# Patient Record
Sex: MALE | Race: WHITE | Employment: FULL TIME | URBAN - METROPOLITAN AREA
[De-identification: names, ages, dates, MRNs, and addresses within clinical notes are randomized per-mention and may not be internally consistent; named-entity substitution may affect disease eponyms.]

---

## 2019-02-01 ENCOUNTER — OFFICE VISIT (OUTPATIENT)
Dept: FAMILY MEDICINE CLINIC | Facility: CLINIC | Age: 49
End: 2019-02-01

## 2019-02-01 VITALS
SYSTOLIC BLOOD PRESSURE: 132 MMHG | DIASTOLIC BLOOD PRESSURE: 76 MMHG | RESPIRATION RATE: 16 BRPM | HEIGHT: 76.5 IN | TEMPERATURE: 98 F | BODY MASS INDEX: 37.96 KG/M2 | OXYGEN SATURATION: 98 % | HEART RATE: 72 BPM | WEIGHT: 315 LBS

## 2019-02-01 DIAGNOSIS — E66.01 CLASS 3 SEVERE OBESITY DUE TO EXCESS CALORIES WITHOUT SERIOUS COMORBIDITY WITH BODY MASS INDEX (BMI) OF 50.0 TO 59.9 IN ADULT (HCC): ICD-10-CM

## 2019-02-01 DIAGNOSIS — M54.9 UPPER BACK PAIN ON LEFT SIDE: Primary | ICD-10-CM

## 2019-02-01 DIAGNOSIS — R20.2 PARESTHESIAS IN LEFT HAND: ICD-10-CM

## 2019-02-01 PROCEDURE — 99203 OFFICE O/P NEW LOW 30 MIN: CPT | Performed by: FAMILY MEDICINE

## 2019-02-01 RX ORDER — CARISOPRODOL 350 MG/1
350 TABLET ORAL 2 TIMES DAILY PRN
Qty: 10 TABLET | Refills: 0 | Status: SHIPPED | OUTPATIENT
Start: 2019-02-01 | End: 2019-02-07 | Stop reason: ALTCHOICE

## 2019-02-01 RX ORDER — PREDNISONE 10 MG/1
10 TABLET ORAL 2 TIMES DAILY
Qty: 10 TABLET | Refills: 0 | Status: SHIPPED | OUTPATIENT
Start: 2019-02-01 | End: 2019-02-07 | Stop reason: ALTCHOICE

## 2019-02-01 RX ORDER — PREDNISONE 20 MG/1
20 TABLET ORAL 2 TIMES DAILY
Qty: 10 TABLET | Refills: 0 | Status: SHIPPED | OUTPATIENT
Start: 2019-02-01 | End: 2019-02-07 | Stop reason: ALTCHOICE

## 2019-02-01 NOTE — PROGRESS NOTES
HPI:   Erasmo Sinha is a 53year old male that presents for Patient presents with:  Back Pain: pinched nerve upper back, pt was getting his teeth cleaned and when they leaned him back in the chair he felt it pinch and since then has gotten worse.  pt has rhythm, no murmurs, rubs or gallops. LUNGS: Clear to auscultation bilterally, no rales/rhonchi/wheezing. ABDOMEN:  Soft, nondistended, nontender, bowel sounds normal in all 4 quadrants, no masses, no hepatosplenomegaly.   EXTREMITIES:  No edema, no cyanos

## 2019-02-02 PROBLEM — E66.813 CLASS 3 SEVERE OBESITY DUE TO EXCESS CALORIES WITHOUT SERIOUS COMORBIDITY WITH BODY MASS INDEX (BMI) OF 50.0 TO 59.9 IN ADULT (HCC): Status: ACTIVE | Noted: 2019-02-02

## 2019-02-02 PROBLEM — M54.9 UPPER BACK PAIN ON LEFT SIDE: Status: ACTIVE | Noted: 2019-02-02

## 2019-02-02 PROBLEM — E66.813 CLASS 3 SEVERE OBESITY DUE TO EXCESS CALORIES WITHOUT SERIOUS COMORBIDITY WITH BODY MASS INDEX (BMI) OF 50.0 TO 59.9 IN ADULT: Status: ACTIVE | Noted: 2019-02-02

## 2019-02-02 PROBLEM — E66.01 CLASS 3 SEVERE OBESITY DUE TO EXCESS CALORIES WITHOUT SERIOUS COMORBIDITY WITH BODY MASS INDEX (BMI) OF 50.0 TO 59.9 IN ADULT (HCC): Status: ACTIVE | Noted: 2019-02-02

## 2019-02-02 PROBLEM — R20.2 PARESTHESIAS IN LEFT HAND: Status: ACTIVE | Noted: 2019-02-02

## 2019-02-07 ENCOUNTER — OFFICE VISIT (OUTPATIENT)
Dept: FAMILY MEDICINE CLINIC | Facility: CLINIC | Age: 49
End: 2019-02-07

## 2019-02-07 VITALS
BODY MASS INDEX: 37.96 KG/M2 | RESPIRATION RATE: 16 BRPM | DIASTOLIC BLOOD PRESSURE: 80 MMHG | TEMPERATURE: 98 F | HEIGHT: 76.5 IN | WEIGHT: 315 LBS | SYSTOLIC BLOOD PRESSURE: 134 MMHG | OXYGEN SATURATION: 99 % | HEART RATE: 74 BPM

## 2019-02-07 DIAGNOSIS — M54.9 UPPER BACK PAIN ON LEFT SIDE: ICD-10-CM

## 2019-02-07 DIAGNOSIS — R20.2 PARESTHESIAS IN LEFT HAND: ICD-10-CM

## 2019-02-07 DIAGNOSIS — Z00.00 ROUTINE ADULT HEALTH MAINTENANCE: Primary | ICD-10-CM

## 2019-02-07 PROCEDURE — 99214 OFFICE O/P EST MOD 30 MIN: CPT | Performed by: FAMILY MEDICINE

## 2019-02-07 RX ORDER — GABAPENTIN 300 MG/1
300 CAPSULE ORAL 3 TIMES DAILY PRN
Qty: 30 CAPSULE | Refills: 0 | Status: SHIPPED | OUTPATIENT
Start: 2019-02-07 | End: 2019-02-18

## 2019-02-07 RX ORDER — CYCLOBENZAPRINE HCL 10 MG
1 TABLET ORAL AS NEEDED
Refills: 0 | COMMUNITY
Start: 2019-01-27 | End: 2019-04-05

## 2019-02-09 NOTE — PROGRESS NOTES
HPI:   Yamilex Vega is a 53year old male that presents for Patient presents with:  Arm Pain: pinched nerve pain , Left forearm - having trouble extending out arm. Patient states that he has some pain in the left shoulder as well off and on.   He den turgor. HEART:  Regular rate and rhythm, no murmurs, rubs or gallops. LUNGS: Clear to auscultation bilterally, no rales/rhonchi/wheezing. ABDOMEN:  Soft, nondistended, nontender, bowel sounds normal in all 4 quadrants, no masses, no hepatosplenomegaly.

## 2019-02-15 ENCOUNTER — OFFICE VISIT (OUTPATIENT)
Dept: FAMILY MEDICINE CLINIC | Facility: CLINIC | Age: 49
End: 2019-02-15

## 2019-02-15 VITALS
DIASTOLIC BLOOD PRESSURE: 76 MMHG | BODY MASS INDEX: 37.96 KG/M2 | HEIGHT: 76.5 IN | WEIGHT: 315 LBS | OXYGEN SATURATION: 99 % | RESPIRATION RATE: 16 BRPM | SYSTOLIC BLOOD PRESSURE: 134 MMHG | TEMPERATURE: 98 F | HEART RATE: 76 BPM

## 2019-02-15 DIAGNOSIS — M25.512 LEFT SHOULDER PAIN, UNSPECIFIED CHRONICITY: Primary | ICD-10-CM

## 2019-02-15 DIAGNOSIS — R20.2 PARESTHESIAS IN LEFT HAND: ICD-10-CM

## 2019-02-15 PROCEDURE — 99214 OFFICE O/P EST MOD 30 MIN: CPT | Performed by: FAMILY MEDICINE

## 2019-02-15 NOTE — PROGRESS NOTES
HPI:   Joann Wolf is a 53year old male that presents for Patient presents with:   Follow - Up: pinched nerve follow up- both medications have helped but fingers are tingling still.  '  Patient is here for follow-up of left scapular pain and paresthesi good dentition. NECK: Supple, no cervical LAD, no thyromegaly. SKIN: No rashes, no skin lesion, no bruising, good turgor. HEART:  Regular rate and rhythm, no murmurs, rubs or gallops. LUNGS: Clear to auscultation bilterally, no rales/rhonchi/wheezing.

## 2019-02-18 ENCOUNTER — TELEPHONE (OUTPATIENT)
Dept: FAMILY MEDICINE CLINIC | Facility: CLINIC | Age: 49
End: 2019-02-18

## 2019-02-18 DIAGNOSIS — Z00.00 ROUTINE ADULT HEALTH MAINTENANCE: ICD-10-CM

## 2019-02-18 RX ORDER — GABAPENTIN 300 MG/1
300 CAPSULE ORAL 3 TIMES DAILY PRN
Qty: 90 CAPSULE | Refills: 0 | Status: SHIPPED | OUTPATIENT
Start: 2019-02-18 | End: 2019-04-05

## 2019-02-18 NOTE — TELEPHONE ENCOUNTER
See 2-15-19 OV notes per Dr. Dominik Matamoros, sent in [employer]. Pt informed. Task completed.

## 2019-02-18 NOTE — TELEPHONE ENCOUNTER
Patient called for a refill for gabapentin 300 MG Oral Cap  He was just here last Friday and forgot to request the refill and is out of the medication. He is going out of town and has to leave today by 10:30 am - he will be out of town for a week.   Please

## 2019-02-25 NOTE — PROGRESS NOTES
SPINE EVALUATION:   Referring Physician: Dr. Creola Cheadle  Diagnosis: Left shoulder pain, unspecified chronicity (M25.512)       Date of Service: 2/25/2019     PATIENT SUMMARY   Chance Cobos is a 53year old y/o male who presents to therapy today with comp returned to walking  Reports fear of lifting, grabbing something due to setting off that higher pain a lot. Pt works in sales, lots of sitting in the day, does have a standing desk now.    Pt describes pain level at worst 3/10, at best 0/10, at worst 1/10 Christine Fletcher would benefit from skilled Physical Therapy to address the above impairments to progress towards PLOF, return to working out, improve tolerance for standing/sitting for work purpose.      Precautions:  None  OBJECTIVE:   Observation/Posture: sit daily), rotation R with towel faciliation x 1 x 2 daily  Charges: PT Eval Low Complexity, TE x 1; TA x 1      Total Timed Treatment: 25 min     Total Treatment Time: 53 min     PLAN OF CARE:    Goals:    (all to be met in 10 visits)  · Pt will demonstrate

## 2019-02-26 ENCOUNTER — OFFICE VISIT (OUTPATIENT)
Dept: PHYSICAL THERAPY | Age: 49
End: 2019-02-26
Attending: FAMILY MEDICINE
Payer: COMMERCIAL

## 2019-02-26 PROCEDURE — 97161 PT EVAL LOW COMPLEX 20 MIN: CPT

## 2019-02-26 PROCEDURE — 97530 THERAPEUTIC ACTIVITIES: CPT

## 2019-02-26 PROCEDURE — 97110 THERAPEUTIC EXERCISES: CPT

## 2019-02-28 ENCOUNTER — OFFICE VISIT (OUTPATIENT)
Dept: PHYSICAL THERAPY | Age: 49
End: 2019-02-28
Attending: FAMILY MEDICINE
Payer: COMMERCIAL

## 2019-02-28 DIAGNOSIS — M25.512 LEFT SHOULDER PAIN, UNSPECIFIED CHRONICITY: ICD-10-CM

## 2019-02-28 PROCEDURE — 97110 THERAPEUTIC EXERCISES: CPT

## 2019-02-28 PROCEDURE — 97112 NEUROMUSCULAR REEDUCATION: CPT

## 2019-02-28 PROCEDURE — 97140 MANUAL THERAPY 1/> REGIONS: CPT

## 2019-02-28 NOTE — PROGRESS NOTES
Dx: Associated DX:  Left shoulder pain, unspecified chronicity (M25.512)           Authorized # of Visits:  8 HMO (10 req POC)         Next MD visit: none scheduled  Fall Risk: standard         Precautions: n/a           Authorizing Provider: Elena Barrera cervical AROM rotation to >75 degrees to improve tolerance for ADL such as turning head to check blind spot while driving   · Postural strength goal to be added once tested  · Pt will be able to lift 25# without reproduction of numbness or pain symptoms.  Kit Ayala

## 2019-03-04 ENCOUNTER — APPOINTMENT (OUTPATIENT)
Dept: PHYSICAL THERAPY | Age: 49
End: 2019-03-04
Attending: FAMILY MEDICINE
Payer: COMMERCIAL

## 2019-03-08 ENCOUNTER — OFFICE VISIT (OUTPATIENT)
Dept: PHYSICAL THERAPY | Age: 49
End: 2019-03-08
Attending: FAMILY MEDICINE
Payer: COMMERCIAL

## 2019-03-08 DIAGNOSIS — M25.512 LEFT SHOULDER PAIN, UNSPECIFIED CHRONICITY: ICD-10-CM

## 2019-03-08 PROCEDURE — 97012 MECHANICAL TRACTION THERAPY: CPT

## 2019-03-08 PROCEDURE — 97140 MANUAL THERAPY 1/> REGIONS: CPT

## 2019-03-08 PROCEDURE — 97110 THERAPEUTIC EXERCISES: CPT

## 2019-03-08 NOTE — PROGRESS NOTES
Dx: Associated DX:  Left shoulder pain, unspecified chronicity (M25.512)           Authorized # of Visits:  8 HMO (10 req POC)         Next MD visit: none scheduled  Fall Risk: standard         Precautions: n/a           Authorizing Provider: Janiya Sierra ADL such as turning head to check blind spot while driving   · Postural strength goal to be added once tested  · Pt will be able to lift 25# without reproduction of numbness or pain symptoms.  MET   · Pt will improve wrist extension strength to 5/5 to impro

## 2019-03-11 ENCOUNTER — OFFICE VISIT (OUTPATIENT)
Dept: PHYSICAL THERAPY | Age: 49
End: 2019-03-11
Attending: FAMILY MEDICINE
Payer: COMMERCIAL

## 2019-03-11 PROCEDURE — 97012 MECHANICAL TRACTION THERAPY: CPT

## 2019-03-11 PROCEDURE — 97140 MANUAL THERAPY 1/> REGIONS: CPT

## 2019-03-11 PROCEDURE — 97110 THERAPEUTIC EXERCISES: CPT

## 2019-03-11 NOTE — PROGRESS NOTES
Dx: Associated DX:  Left shoulder pain, unspecified chronicity (M25.512)           Authorized # of Visits:  8 HMO (10 req POC)         Next MD visit: none scheduled  Fall Risk: standard         Precautions: n/a           Authorizing Provider: Jayant Melgoza into overhead cabinets  · Pt will improve cervical AROM rotation to >75 degrees to improve tolerance for ADL such as turning head to check blind spot while driving   · Postural strength goal to be added once tested  · Pt will be able to lift 25# without re Treatment Time: 45 min

## 2019-03-13 ENCOUNTER — APPOINTMENT (OUTPATIENT)
Dept: PHYSICAL THERAPY | Age: 49
End: 2019-03-13
Attending: FAMILY MEDICINE
Payer: COMMERCIAL

## 2019-03-14 NOTE — PROGRESS NOTES
Dx: Associated DX:  Left shoulder pain, unspecified chronicity (M25.512)           Authorized # of Visits:  8 HMO (10 req POC)         Next MD visit: none scheduled  Fall Risk: standard         Precautions: n/a           Authorizing Provider: Elena Barrera extension AROM and improved range. Farther range with rotation L prior to onset of symptoms. Will continue to progress within tolerance. Repeated cues throughout session for neutral cervical position.      Patient was instructed in and issued a HEP for [facility] DNF supine holds 15s x 5      Manual opening techniques downglides R, L PAIVVM Median nerve glide x 15 x 2 Towel rotation into L rotation x 10 Ed on lifting restrictions and graded progression      Cervical retraction sitting, symptomatic pec stretch 3 x 3

## 2019-03-15 ENCOUNTER — OFFICE VISIT (OUTPATIENT)
Dept: PHYSICAL THERAPY | Age: 49
End: 2019-03-15
Attending: FAMILY MEDICINE
Payer: COMMERCIAL

## 2019-03-15 PROCEDURE — 97112 NEUROMUSCULAR REEDUCATION: CPT

## 2019-03-15 PROCEDURE — 97140 MANUAL THERAPY 1/> REGIONS: CPT

## 2019-03-15 PROCEDURE — 97110 THERAPEUTIC EXERCISES: CPT

## 2019-03-15 NOTE — PROGRESS NOTES
Dx: Associated DX:  Left shoulder pain, unspecified chronicity (M25.512)           Authorized # of Visits:  8 HMO (10 req POC)         Next MD visit: none scheduled  Fall Risk: standard         Precautions: n/a           Authorizing Provider: Mert Johnson for median nerve glide(3 x15), scapular retractions 5s x 10(2x daily), rotation R with towel faciliation x 1 x 2 daily ; cervical retraction supine, suboccipital stretch self, pec stretch    Goals:   (all to be met in 10 visits)  · Pt will demonstrate 45 d Median nerve glide x 15 x 2 Towel rotation into L rotation x 10 Ed on lifting restrictions and graded progression suboccipital release     Cervical retraction sitting, symptomatic pec stretch 3 x 30s Rows 2 x 12 light discussed ex to hold at [facility] Cervical di

## 2019-03-18 ENCOUNTER — OFFICE VISIT (OUTPATIENT)
Dept: PHYSICAL THERAPY | Age: 49
End: 2019-03-18
Attending: FAMILY MEDICINE
Payer: COMMERCIAL

## 2019-03-18 PROCEDURE — 97112 NEUROMUSCULAR REEDUCATION: CPT

## 2019-03-18 PROCEDURE — 97140 MANUAL THERAPY 1/> REGIONS: CPT

## 2019-03-18 PROCEDURE — 97110 THERAPEUTIC EXERCISES: CPT

## 2019-03-20 ENCOUNTER — APPOINTMENT (OUTPATIENT)
Dept: PHYSICAL THERAPY | Age: 49
End: 2019-03-20
Attending: FAMILY MEDICINE
Payer: COMMERCIAL

## 2019-03-22 ENCOUNTER — OFFICE VISIT (OUTPATIENT)
Dept: PHYSICAL THERAPY | Age: 49
End: 2019-03-22
Attending: FAMILY MEDICINE
Payer: COMMERCIAL

## 2019-03-22 PROCEDURE — 97112 NEUROMUSCULAR REEDUCATION: CPT

## 2019-03-22 PROCEDURE — 97140 MANUAL THERAPY 1/> REGIONS: CPT

## 2019-03-22 PROCEDURE — 97530 THERAPEUTIC ACTIVITIES: CPT

## 2019-03-22 PROCEDURE — 97110 THERAPEUTIC EXERCISES: CPT

## 2019-03-22 NOTE — PROGRESS NOTES
Dx: Associated DX:  Left shoulder pain, unspecified chronicity (M25.512)           Authorized # of Visits:  8 HMO (10 req POC)         Next MD visit: none scheduled  Fall Risk: standard         Precautions: n/a           Authorizing Provider: Derrick Sanchez Monday(symptom-dependent), to monitor tolerance of return to working out and discussion safe progression.      Patient was instructed in and issued a HEP for median nerve glide(3 x15), scapular retractions 5s x 10(2x daily), rotation R with towel faciliatio into L rotation Manual opening technique downglides R, L PAIVVM pec stretch 3 x 30s Cervical distraction, suboccipital release downglides L G3 downglides L G3    DNF endurance testing, ed HEP DNF supine holds 15s x 5 Cross body adduction x 15 DNF supine ho

## 2019-03-25 ENCOUNTER — OFFICE VISIT (OUTPATIENT)
Dept: PHYSICAL THERAPY | Age: 49
End: 2019-03-25
Attending: FAMILY MEDICINE
Payer: COMMERCIAL

## 2019-03-25 PROCEDURE — 97110 THERAPEUTIC EXERCISES: CPT

## 2019-03-25 PROCEDURE — 97112 NEUROMUSCULAR REEDUCATION: CPT

## 2019-03-25 PROCEDURE — 97140 MANUAL THERAPY 1/> REGIONS: CPT

## 2019-03-25 NOTE — PROGRESS NOTES
Discharge Summary    Dx: Associated DX:  Left shoulder pain, unspecified chronicity (M25.512)           Authorized # of Visits:  8 HMO (10 req POC)         Next MD visit: none scheduled  Fall Risk: standard         Precautions: n/a           Authorizing P all planes. Gil Lock verbalizes more awareness for postural positioning throughout day and with working out. Gil Lock does note that end range L rotation causes some slight increase in second and third digit finger numbness but significantly improved.  Bala lifting rest/weights    Scapular testing Seated cervical retraction Manual opening technique downglides R, L PAIVVM transverse mobs into L rotation multiple reps up to G4 transverse mobs thoracic spine into L rotation multiple reps up to G4 transverse mobs cervical retraction maintenance. Front raise with 5# B x 10 with ball behind head and mild cervical retraction maintenance.   D2 ext 2 x 12 RTB D2 ext 2 x 12 RTB   B ext 2 x 12 light mechanical traction up to 16-18# 30s on, 20s off, 9 min total   cerv ball

## 2019-03-29 ENCOUNTER — APPOINTMENT (OUTPATIENT)
Dept: PHYSICAL THERAPY | Age: 49
End: 2019-03-29
Attending: FAMILY MEDICINE
Payer: COMMERCIAL

## 2019-04-05 ENCOUNTER — OFFICE VISIT (OUTPATIENT)
Dept: FAMILY MEDICINE CLINIC | Facility: CLINIC | Age: 49
End: 2019-04-05

## 2019-04-05 VITALS
SYSTOLIC BLOOD PRESSURE: 138 MMHG | BODY MASS INDEX: 37.96 KG/M2 | HEIGHT: 76.5 IN | HEART RATE: 67 BPM | TEMPERATURE: 98 F | OXYGEN SATURATION: 100 % | RESPIRATION RATE: 14 BRPM | DIASTOLIC BLOOD PRESSURE: 78 MMHG | WEIGHT: 315 LBS

## 2019-04-05 DIAGNOSIS — M25.512 LEFT SHOULDER PAIN, UNSPECIFIED CHRONICITY: Primary | ICD-10-CM

## 2019-04-05 DIAGNOSIS — M79.674 PAIN OF TOE OF RIGHT FOOT: ICD-10-CM

## 2019-04-05 DIAGNOSIS — E66.01 CLASS 3 SEVERE OBESITY DUE TO EXCESS CALORIES WITHOUT SERIOUS COMORBIDITY WITH BODY MASS INDEX (BMI) OF 50.0 TO 59.9 IN ADULT (HCC): ICD-10-CM

## 2019-04-05 PROCEDURE — 99214 OFFICE O/P EST MOD 30 MIN: CPT | Performed by: FAMILY MEDICINE

## 2019-04-05 NOTE — PROGRESS NOTES
HPI:   Nyasia Wood is a 53year old male that presents for Patient presents with:  Shoulder Pain: follow up on PT    Patient states that his shoulder is doing much better. He has finished physical therapy.   He does have a possible hammertoe on the sec normal in all 4 quadrants, no masses, no hepatosplenomegaly. EXTREMITIES:  No edema, no cyanosis, 2+ radial pulses b/l. NEURO:  Grossly normal     ASSESSMENT AND PLAN:      1. Pain of toe of right foot  - PODIATRY - INTERNAL    2.  Class 3 severe obesity

## 2019-07-18 ENCOUNTER — OFFICE VISIT (OUTPATIENT)
Dept: FAMILY MEDICINE CLINIC | Facility: CLINIC | Age: 49
End: 2019-07-18

## 2019-07-18 VITALS
WEIGHT: 315 LBS | HEART RATE: 65 BPM | RESPIRATION RATE: 16 BRPM | DIASTOLIC BLOOD PRESSURE: 80 MMHG | HEIGHT: 76.5 IN | BODY MASS INDEX: 37.96 KG/M2 | SYSTOLIC BLOOD PRESSURE: 130 MMHG | TEMPERATURE: 97 F | OXYGEN SATURATION: 98 %

## 2019-07-18 DIAGNOSIS — Z01.89 ROUTINE LAB DRAW: ICD-10-CM

## 2019-07-18 DIAGNOSIS — L03.031 PARONYCHIA OF GREAT TOE OF RIGHT FOOT: Primary | ICD-10-CM

## 2019-07-18 PROCEDURE — 10060 I&D ABSCESS SIMPLE/SINGLE: CPT | Performed by: PHYSICIAN ASSISTANT

## 2019-07-18 PROCEDURE — 99213 OFFICE O/P EST LOW 20 MIN: CPT | Performed by: PHYSICIAN ASSISTANT

## 2019-07-18 RX ORDER — CLINDAMYCIN HYDROCHLORIDE 300 MG/1
300 CAPSULE ORAL 2 TIMES DAILY
Qty: 20 CAPSULE | Refills: 0 | Status: SHIPPED | OUTPATIENT
Start: 2019-07-18 | End: 2020-09-18

## 2019-07-18 NOTE — PROGRESS NOTES
[facility] Internal Medicine Progress Note    CC:  Patient presents with: Infection: right great toe. HPI:   HPI  Pt went kayaking over the weekend and on Monday his R big toe was irritated.   He thought it was the new water shoes he was wea benefits and risk of bleeding or worsening infection and the patient gave verbal consent to continue with procedure.   Preparation with alcohol  Anesthesia: none  Incision with an 18 gauge needle  Drainage of 1 ml of pus  Sent to culture: no  Dressing: Baci

## 2020-07-02 ENCOUNTER — TELEPHONE (OUTPATIENT)
Dept: FAMILY MEDICINE CLINIC | Facility: CLINIC | Age: 50
End: 2020-07-02

## 2020-07-02 NOTE — TELEPHONE ENCOUNTER
Patient would like to know where he can have his blood pressure checked. Patient states that he is feeling well. Patient will call office to schedule an annual wellness, but would like to know where to go to check his BP in the meantime.

## 2020-09-18 ENCOUNTER — OFFICE VISIT (OUTPATIENT)
Dept: FAMILY MEDICINE CLINIC | Facility: CLINIC | Age: 50
End: 2020-09-18

## 2020-09-18 VITALS
TEMPERATURE: 97 F | SYSTOLIC BLOOD PRESSURE: 128 MMHG | OXYGEN SATURATION: 99 % | RESPIRATION RATE: 16 BRPM | HEIGHT: 76 IN | BODY MASS INDEX: 38.36 KG/M2 | DIASTOLIC BLOOD PRESSURE: 82 MMHG | HEART RATE: 63 BPM | WEIGHT: 315 LBS

## 2020-09-18 DIAGNOSIS — Z00.00 ROUTINE GENERAL MEDICAL EXAMINATION AT A HEALTH CARE FACILITY: Primary | ICD-10-CM

## 2020-09-18 DIAGNOSIS — E66.01 CLASS 3 SEVERE OBESITY DUE TO EXCESS CALORIES WITHOUT SERIOUS COMORBIDITY WITH BODY MASS INDEX (BMI) OF 50.0 TO 59.9 IN ADULT (HCC): ICD-10-CM

## 2020-09-18 DIAGNOSIS — H93.13 TINNITUS OF BOTH EARS: ICD-10-CM

## 2020-09-18 DIAGNOSIS — F41.9 ANXIETY: ICD-10-CM

## 2020-09-18 PROCEDURE — 99214 OFFICE O/P EST MOD 30 MIN: CPT | Performed by: FAMILY MEDICINE

## 2020-09-18 PROCEDURE — G0438 PPPS, INITIAL VISIT: HCPCS | Performed by: FAMILY MEDICINE

## 2020-09-18 PROCEDURE — 99396 PREV VISIT EST AGE 40-64: CPT | Performed by: FAMILY MEDICINE

## 2020-09-18 PROCEDURE — 3008F BODY MASS INDEX DOCD: CPT | Performed by: FAMILY MEDICINE

## 2020-09-18 PROCEDURE — 3074F SYST BP LT 130 MM HG: CPT | Performed by: FAMILY MEDICINE

## 2020-09-18 PROCEDURE — 3079F DIAST BP 80-89 MM HG: CPT | Performed by: FAMILY MEDICINE

## 2020-09-18 NOTE — PROGRESS NOTES
Saeed Taylor is a 51year old male who presents for a complete physical exam.   HPI:   Pt complains of tinnitus in both ears off and on for the past 1 year. It is mild but persistent. He notices it more at nighttime.   He also states that having anxiet weight gain/weight loss/enlargement of neck glands/polyuria/polydypsia  ALL/ASTHMA: denies allergic rhinitis/asthma    EXAM:   /82 (BP Location: Right arm, Patient Position: Sitting)   Pulse 63   Temp 96.9 °F (36.1 °C) (Temporal)   Resp 16   Ht 76\" health care facility  -     CBC WITH DIFFERENTIAL WITH PLATELET; Future  -     COMP METABOLIC PANEL (14); Future  -     LIPID PANEL;  Future  -     HEMOGLOBIN A1C; Future  -     TSH+FREE T4; Future    Tinnitus of both ears  -     ENT - INTERNAL    Anxiety

## 2020-11-15 ENCOUNTER — OFFICE VISIT (OUTPATIENT)
Dept: FAMILY MEDICINE CLINIC | Facility: CLINIC | Age: 50
End: 2020-11-15

## 2020-11-15 VITALS
HEART RATE: 67 BPM | TEMPERATURE: 99 F | RESPIRATION RATE: 16 BRPM | WEIGHT: 315 LBS | HEIGHT: 76 IN | BODY MASS INDEX: 38.36 KG/M2 | SYSTOLIC BLOOD PRESSURE: 138 MMHG | DIASTOLIC BLOOD PRESSURE: 82 MMHG | OXYGEN SATURATION: 99 %

## 2020-11-15 DIAGNOSIS — Z20.822 EXPOSURE TO COVID-19 VIRUS: Primary | ICD-10-CM

## 2020-11-15 PROCEDURE — 3075F SYST BP GE 130 - 139MM HG: CPT | Performed by: NURSE PRACTITIONER

## 2020-11-15 PROCEDURE — 3079F DIAST BP 80-89 MM HG: CPT | Performed by: NURSE PRACTITIONER

## 2020-11-15 PROCEDURE — 3008F BODY MASS INDEX DOCD: CPT | Performed by: NURSE PRACTITIONER

## 2020-11-15 PROCEDURE — 99212 OFFICE O/P EST SF 10 MIN: CPT | Performed by: NURSE PRACTITIONER

## 2020-11-15 NOTE — PROGRESS NOTES
CHIEF COMPLAINT:   Patient presents with:  Testing: covid exposure, no sx      HPI:   Jah Hou is a 53year old male who presents for potential Covid 19 exposure.  Reports daughters classroom has 9 positive cases, daughter sat next to someone who was after testing negative. Meds & Refills for this Visit:  Requested Prescriptions      No prescriptions requested or ordered in this encounter                       There are no Patient Instructions on file for this visit.         The patient indicates

## 2021-06-21 ENCOUNTER — OFFICE VISIT (OUTPATIENT)
Dept: FAMILY MEDICINE CLINIC | Facility: CLINIC | Age: 51
End: 2021-06-21

## 2021-06-21 VITALS
SYSTOLIC BLOOD PRESSURE: 122 MMHG | RESPIRATION RATE: 18 BRPM | WEIGHT: 315 LBS | TEMPERATURE: 97 F | HEART RATE: 84 BPM | BODY MASS INDEX: 38.36 KG/M2 | HEIGHT: 76 IN | OXYGEN SATURATION: 97 % | DIASTOLIC BLOOD PRESSURE: 80 MMHG

## 2021-06-21 DIAGNOSIS — Z00.00 WELLNESS EXAMINATION: Primary | ICD-10-CM

## 2021-06-21 PROCEDURE — 3008F BODY MASS INDEX DOCD: CPT | Performed by: FAMILY MEDICINE

## 2021-06-21 PROCEDURE — 90471 IMMUNIZATION ADMIN: CPT | Performed by: FAMILY MEDICINE

## 2021-06-21 PROCEDURE — 90715 TDAP VACCINE 7 YRS/> IM: CPT | Performed by: FAMILY MEDICINE

## 2021-06-21 PROCEDURE — 99396 PREV VISIT EST AGE 40-64: CPT | Performed by: FAMILY MEDICINE

## 2021-06-21 PROCEDURE — 3074F SYST BP LT 130 MM HG: CPT | Performed by: FAMILY MEDICINE

## 2021-06-21 PROCEDURE — 3079F DIAST BP 80-89 MM HG: CPT | Performed by: FAMILY MEDICINE

## 2021-06-21 NOTE — PROGRESS NOTES
Wellness Exam    REASON FOR VISIT:    Queta Baker is a 53year old male who presents for an [address]     Current Complaints: Mr. Sudhakar Hernadez is here for his boy  physical  Flu Shot: see immunization record  Health Maintenance Topics wi Recommendation Internal Lab or Procedure External Lab or Procedure   Cholesterol Screening Recommended screening varies with age, risk and gender No results found for: LDL, LDLC    Diabetes Screening  If history of high blood pressure or other  risk factor easily. Psychiatric/Behavioral: Negative for confusion and agitation. The patient is not nervous/anxious.       EXAM:   /80 (BP Location: Right arm, Patient Position: Sitting, Cuff Size: large)   Pulse 84   Temp 97 °F (36.1 °C) (Temporal)   Resp 18 DIPHTHERIA TOXOIDS AND ACELLULAR PERTUSIS VACCINE (TDAP), >7 YEARS, IM USE        This note was prepared using Dragon Medical voice recognition dictation software. As a result errors may occur. When identified these errors have been corrected.  While every

## 2021-06-21 NOTE — PATIENT INSTRUCTIONS
Thank you for choosing Dahiana Beavers MD at [place] [address]  To Do: Damion Mccarty  1. Please see age appropriate health prevention below    [employer] is located in [address]. Monday, Tuesday & Friday – 8 a.m. to 4 p.m.   Wednesday, Thurs the benefits outweigh those potential risks and we strive to make you healthier and to improve your quality of life.     Referrals, and Radiology Information:    If your insurance requires a referral to a specialist, please allow 5 business days to process exams   Blood pressure All men in this age group Yearly checkup if your blood pressure reading is normal  Normal blood pressure is less than 120/80 mm Hg  If your blood pressure is higher than normal, follow the advice of your healthcare provider      Depr dose should be given at least 2 months after the second dose and at least 4 months after the first dose   Haemophilus influenzae Type B (HIB) Men at increased risk for infection – talk with your healthcare provider 1 to 3 doses   Influenza (flu) All men in

## 2021-11-18 ENCOUNTER — TELEPHONE (OUTPATIENT)
Dept: FAMILY MEDICINE CLINIC | Facility: CLINIC | Age: 51
End: 2021-11-18

## 2021-11-18 DIAGNOSIS — Z00.00 LABORATORY EXAMINATION ORDERED AS PART OF A ROUTINE GENERAL MEDICAL EXAMINATION: Primary | ICD-10-CM

## 2021-11-18 NOTE — TELEPHONE ENCOUNTER
Future Appointments   Date Time Provider Carol Winn   11/19/2021 10:40 AM Anup Padilla MD [facility] [address] [facility] [address]     Patient would like lab orders to go early am, please advise.

## 2021-11-19 ENCOUNTER — PATIENT MESSAGE (OUTPATIENT)
Dept: FAMILY MEDICINE CLINIC | Facility: CLINIC | Age: 51
End: 2021-11-19

## 2021-11-19 ENCOUNTER — LAB ENCOUNTER (OUTPATIENT)
Dept: LAB | Age: 51
End: 2021-11-19
Attending: FAMILY MEDICINE
Payer: COMMERCIAL

## 2021-11-19 ENCOUNTER — TELEPHONE (OUTPATIENT)
Dept: FAMILY MEDICINE CLINIC | Facility: CLINIC | Age: 51
End: 2021-11-19

## 2021-11-19 ENCOUNTER — OFFICE VISIT (OUTPATIENT)
Dept: FAMILY MEDICINE CLINIC | Facility: CLINIC | Age: 51
End: 2021-11-19

## 2021-11-19 VITALS
RESPIRATION RATE: 16 BRPM | OXYGEN SATURATION: 100 % | WEIGHT: 315 LBS | HEART RATE: 54 BPM | BODY MASS INDEX: 38.36 KG/M2 | SYSTOLIC BLOOD PRESSURE: 118 MMHG | HEIGHT: 76 IN | TEMPERATURE: 97 F | DIASTOLIC BLOOD PRESSURE: 70 MMHG

## 2021-11-19 DIAGNOSIS — Z23 NEED FOR SHINGLES VACCINE: Primary | ICD-10-CM

## 2021-11-19 DIAGNOSIS — E66.01 CLASS 3 SEVERE OBESITY DUE TO EXCESS CALORIES WITHOUT SERIOUS COMORBIDITY WITH BODY MASS INDEX (BMI) OF 50.0 TO 59.9 IN ADULT (HCC): Primary | ICD-10-CM

## 2021-11-19 DIAGNOSIS — Z00.00 ROUTINE ADULT HEALTH MAINTENANCE: ICD-10-CM

## 2021-11-19 DIAGNOSIS — Z00.00 LABORATORY EXAMINATION ORDERED AS PART OF A ROUTINE GENERAL MEDICAL EXAMINATION: ICD-10-CM

## 2021-11-19 DIAGNOSIS — Z12.11 SCREENING FOR COLON CANCER: ICD-10-CM

## 2021-11-19 DIAGNOSIS — Z23 NEED FOR VACCINATION: ICD-10-CM

## 2021-11-19 DIAGNOSIS — Z00.00 ROUTINE ADULT HEALTH MAINTENANCE: Primary | ICD-10-CM

## 2021-11-19 PROCEDURE — 99214 OFFICE O/P EST MOD 30 MIN: CPT | Performed by: FAMILY MEDICINE

## 2021-11-19 PROCEDURE — 80061 LIPID PANEL: CPT

## 2021-11-19 PROCEDURE — 80053 COMPREHEN METABOLIC PANEL: CPT

## 2021-11-19 PROCEDURE — 84443 ASSAY THYROID STIM HORMONE: CPT

## 2021-11-19 PROCEDURE — 90750 HZV VACC RECOMBINANT IM: CPT | Performed by: FAMILY MEDICINE

## 2021-11-19 PROCEDURE — 3008F BODY MASS INDEX DOCD: CPT | Performed by: FAMILY MEDICINE

## 2021-11-19 PROCEDURE — 90686 IIV4 VACC NO PRSV 0.5 ML IM: CPT | Performed by: FAMILY MEDICINE

## 2021-11-19 PROCEDURE — 84439 ASSAY OF FREE THYROXINE: CPT

## 2021-11-19 PROCEDURE — 90471 IMMUNIZATION ADMIN: CPT | Performed by: FAMILY MEDICINE

## 2021-11-19 PROCEDURE — 90472 IMMUNIZATION ADMIN EACH ADD: CPT | Performed by: FAMILY MEDICINE

## 2021-11-19 PROCEDURE — 36415 COLL VENOUS BLD VENIPUNCTURE: CPT

## 2021-11-19 PROCEDURE — 3078F DIAST BP <80 MM HG: CPT | Performed by: FAMILY MEDICINE

## 2021-11-19 PROCEDURE — 85025 COMPLETE CBC W/AUTO DIFF WBC: CPT

## 2021-11-19 PROCEDURE — 3074F SYST BP LT 130 MM HG: CPT | Performed by: FAMILY MEDICINE

## 2021-11-19 NOTE — TELEPHONE ENCOUNTER
Pt scheduled Shingrix #2    Shingrix #1: 11/19/21    Order pended    Future Appointments   Date Time Provider Carol Winn   1/21/2022  1:00 PM [facility] 20 NURSE [facility] [address] [facility] [address]

## 2021-11-19 NOTE — TELEPHONE ENCOUNTER
Dr. Zack Nunez- Pt scheduled 2nd shingles vaccine.     Future Appointments   Date Time Provider Carol Winn   1/21/2022  1:00 PM [facility] 20 NURSE [facility] [address] [facility] [address]

## 2021-11-22 NOTE — PROGRESS NOTES
HPI:   Flavio Sterling is a 49year old male that presents for Patient presents with:  Weight Check  Immunization/Injection: flu vaccine today - due for [facility] - had his booster COVID vaccine on 10/28/21  Other: due for colonoscopy - referral pended    P lymphadenopathy, no thyromegaly. SKIN: no rashes, no suspicious skin lesions, no bruising, good skin turgor. HEART: S1 and S2, regular rate and rhythm, no murmurs, gallops, or rubs.   LUNGS: clear to auscultation bilterally, no rales, rhonchi, or wheezing

## 2021-11-22 NOTE — TELEPHONE ENCOUNTER
From: Roseann Sandoval  To: Marisue Felty, MD  Sent: 11/19/2021 6:04 PM CST  Subject: Question regarding CBC W/ DIFFERENTIAL    A couple of the results/values were on the lower end of the “green” range. Is there anything I need to be concerned about.

## 2022-01-21 ENCOUNTER — NURSE ONLY (OUTPATIENT)
Dept: FAMILY MEDICINE CLINIC | Facility: CLINIC | Age: 52
End: 2022-01-21
Payer: COMMERCIAL

## 2022-01-21 DIAGNOSIS — Z23 NEED FOR SHINGLES VACCINE: ICD-10-CM

## 2022-01-21 PROCEDURE — 90471 IMMUNIZATION ADMIN: CPT | Performed by: FAMILY MEDICINE

## 2022-01-21 PROCEDURE — 90750 HZV VACC RECOMBINANT IM: CPT | Performed by: FAMILY MEDICINE

## 2022-02-08 ENCOUNTER — HOSPITAL ENCOUNTER (OUTPATIENT)
Dept: BONE DENSITY | Age: 52
Discharge: HOME | End: 2022-02-08
Attending: FAMILY MEDICINE

## 2022-02-08 DIAGNOSIS — Z13.9 ENCOUNTER FOR SCREENING: ICD-10-CM

## 2022-02-09 ENCOUNTER — TELEPHONE (OUTPATIENT)
Dept: INTEGRATIVE MEDICINE | Facility: CLINIC | Age: 52
End: 2022-02-09

## 2022-02-09 ENCOUNTER — OFFICE VISIT (OUTPATIENT)
Dept: FAMILY MEDICINE CLINIC | Facility: CLINIC | Age: 52
End: 2022-02-09
Payer: COMMERCIAL

## 2022-02-09 VITALS
HEIGHT: 76 IN | BODY MASS INDEX: 38.36 KG/M2 | HEART RATE: 55 BPM | TEMPERATURE: 97 F | OXYGEN SATURATION: 100 % | SYSTOLIC BLOOD PRESSURE: 110 MMHG | DIASTOLIC BLOOD PRESSURE: 72 MMHG | RESPIRATION RATE: 18 BRPM | WEIGHT: 315 LBS

## 2022-02-09 DIAGNOSIS — E66.01 CLASS 3 SEVERE OBESITY DUE TO EXCESS CALORIES WITHOUT SERIOUS COMORBIDITY WITH BODY MASS INDEX (BMI) OF 45.0 TO 49.9 IN ADULT (HCC): Primary | ICD-10-CM

## 2022-02-09 PROCEDURE — 3008F BODY MASS INDEX DOCD: CPT | Performed by: FAMILY MEDICINE

## 2022-02-09 PROCEDURE — 3074F SYST BP LT 130 MM HG: CPT | Performed by: FAMILY MEDICINE

## 2022-02-09 PROCEDURE — 3078F DIAST BP <80 MM HG: CPT | Performed by: FAMILY MEDICINE

## 2022-02-09 PROCEDURE — 99214 OFFICE O/P EST MOD 30 MIN: CPT | Performed by: FAMILY MEDICINE

## 2022-02-09 NOTE — TELEPHONE ENCOUNTER
Spoke to patient regarding DEXA body composition test results he had completed. Test ordered by MA in PCP's office under Dr. Collin House. Results came to Dr. Army Jia ash. Will discuss with provider the next steps and inform the patient.

## 2022-02-10 PROBLEM — E66.813 CLASS 3 SEVERE OBESITY DUE TO EXCESS CALORIES WITHOUT SERIOUS COMORBIDITY WITH BODY MASS INDEX (BMI) OF 45.0 TO 49.9 IN ADULT: Status: ACTIVE | Noted: 2019-02-02

## 2022-02-10 PROBLEM — E66.813 CLASS 3 SEVERE OBESITY DUE TO EXCESS CALORIES WITHOUT SERIOUS COMORBIDITY WITH BODY MASS INDEX (BMI) OF 50.0 TO 59.9 IN ADULT (HCC): Status: RESOLVED | Noted: 2019-02-02 | Resolved: 2022-02-10

## 2022-02-10 PROBLEM — E66.01 CLASS 3 SEVERE OBESITY DUE TO EXCESS CALORIES WITHOUT SERIOUS COMORBIDITY WITH BODY MASS INDEX (BMI) OF 50.0 TO 59.9 IN ADULT (HCC): Status: RESOLVED | Noted: 2019-02-02 | Resolved: 2022-02-10

## 2022-02-10 PROBLEM — E66.813 CLASS 3 SEVERE OBESITY DUE TO EXCESS CALORIES WITHOUT SERIOUS COMORBIDITY WITH BODY MASS INDEX (BMI) OF 50.0 TO 59.9 IN ADULT: Status: RESOLVED | Noted: 2019-02-02 | Resolved: 2022-02-10

## 2022-02-10 PROBLEM — E66.01 CLASS 3 SEVERE OBESITY DUE TO EXCESS CALORIES WITHOUT SERIOUS COMORBIDITY WITH BODY MASS INDEX (BMI) OF 45.0 TO 49.9 IN ADULT (HCC): Status: ACTIVE | Noted: 2019-02-02

## 2022-02-10 PROBLEM — E66.813 CLASS 3 SEVERE OBESITY DUE TO EXCESS CALORIES WITHOUT SERIOUS COMORBIDITY WITH BODY MASS INDEX (BMI) OF 45.0 TO 49.9 IN ADULT (HCC): Status: ACTIVE | Noted: 2019-02-02

## 2022-02-15 NOTE — TELEPHONE ENCOUNTER
Spoke to patient, he states he called earlier to state he received a MyChart message from Dr. Andrea Ortiz stating he can interpret the test. Will confirm with Dr. Vinita Rueda and then inform the patient. Patient states he is willing to wait until Dr. Tawanna Dooley returns, will schedule with her.

## 2022-02-28 ENCOUNTER — TELEMEDICINE (OUTPATIENT)
Dept: INTEGRATIVE MEDICINE | Facility: CLINIC | Age: 52
End: 2022-02-28

## 2022-02-28 DIAGNOSIS — E66.01 CLASS 3 SEVERE OBESITY DUE TO EXCESS CALORIES WITHOUT SERIOUS COMORBIDITY WITH BODY MASS INDEX (BMI) OF 45.0 TO 49.9 IN ADULT (HCC): Primary | ICD-10-CM

## 2022-02-28 DIAGNOSIS — R53.82 CHRONIC FATIGUE: ICD-10-CM

## 2022-02-28 PROCEDURE — 99214 OFFICE O/P EST MOD 30 MIN: CPT | Performed by: FAMILY MEDICINE

## 2022-04-06 ENCOUNTER — HOSPITAL ENCOUNTER (OUTPATIENT)
Facility: HOSPITAL | Age: 52
Setting detail: HOSPITAL OUTPATIENT SURGERY
Discharge: HOME | End: 2022-04-06
Attending: INTERNAL MEDICINE | Admitting: INTERNAL MEDICINE
Payer: COMMERCIAL

## 2022-04-06 ENCOUNTER — ANESTHESIA (OUTPATIENT)
Dept: ENDOSCOPY | Facility: HOSPITAL | Age: 52
End: 2022-04-06
Payer: COMMERCIAL

## 2022-04-06 ENCOUNTER — ANESTHESIA EVENT (OUTPATIENT)
Dept: ENDOSCOPY | Facility: HOSPITAL | Age: 52
End: 2022-04-06
Payer: COMMERCIAL

## 2022-04-06 VITALS
RESPIRATION RATE: 16 BRPM | BODY MASS INDEX: 38.36 KG/M2 | DIASTOLIC BLOOD PRESSURE: 55 MMHG | HEART RATE: 57 BPM | HEIGHT: 76 IN | WEIGHT: 315 LBS | TEMPERATURE: 98 F | OXYGEN SATURATION: 97 % | SYSTOLIC BLOOD PRESSURE: 98 MMHG

## 2022-04-06 DIAGNOSIS — Z12.11 SCREEN FOR COLON CANCER: ICD-10-CM

## 2022-04-06 PROCEDURE — 0DJD8ZZ INSPECTION OF LOWER INTESTINAL TRACT, VIA NATURAL OR ARTIFICIAL OPENING ENDOSCOPIC: ICD-10-PCS | Performed by: INTERNAL MEDICINE

## 2022-04-06 RX ORDER — SODIUM CHLORIDE, SODIUM LACTATE, POTASSIUM CHLORIDE, CALCIUM CHLORIDE 600; 310; 30; 20 MG/100ML; MG/100ML; MG/100ML; MG/100ML
INJECTION, SOLUTION INTRAVENOUS CONTINUOUS
Status: DISCONTINUED | OUTPATIENT
Start: 2022-04-06 | End: 2022-04-06

## 2022-04-06 RX ORDER — ONDANSETRON 2 MG/ML
4 INJECTION INTRAMUSCULAR; INTRAVENOUS AS NEEDED
Status: DISCONTINUED | OUTPATIENT
Start: 2022-04-06 | End: 2022-04-06

## 2022-04-06 RX ORDER — NALOXONE HYDROCHLORIDE 0.4 MG/ML
80 INJECTION, SOLUTION INTRAMUSCULAR; INTRAVENOUS; SUBCUTANEOUS AS NEEDED
Status: DISCONTINUED | OUTPATIENT
Start: 2022-04-06 | End: 2022-04-06

## 2022-04-06 RX ORDER — LIDOCAINE HYDROCHLORIDE 10 MG/ML
INJECTION, SOLUTION EPIDURAL; INFILTRATION; INTRACAUDAL; PERINEURAL AS NEEDED
Status: DISCONTINUED | OUTPATIENT
Start: 2022-04-06 | End: 2022-04-06 | Stop reason: SURG

## 2022-04-06 RX ADMIN — LIDOCAINE HYDROCHLORIDE 10 MG: 10 INJECTION, SOLUTION EPIDURAL; INFILTRATION; INTRACAUDAL; PERINEURAL at 07:55:00

## 2022-04-06 NOTE — ANESTHESIA POSTPROCEDURE EVALUATION
[facility] Tyrus Sprawls Patient Status:  Hospital Outpatient Surgery   Age/Gender 49year old male MRN [id number]   Location [address] [facility] Attending Osvaldo Phan, [facility] Day # 0 PCP Veronica Lugo MD       Anesthesia Post-op Note    COLONOSCOPY    Procedure Summary     Date: 04/06/22 Room / Location: [facility] ENDOSCOPY 02 / [facility] ENDOSCOPY    Anesthesia Start: 0252 Anesthesia Stop:     Procedure: COLONOSCOPY (N/A ) Diagnosis:       Screen for colon cancer      (internal hemorrhoids)    Surgeons: Osvaldo Phan DO Anesthesiologist: Ambika Andrew MD    Anesthesia Type: MAC ASA Status: 2          Anesthesia Type: MAC    Vitals Value Taken Time   /49 04/06/22 0821   Temp 98 04/06/22 0823   Pulse 67 04/06/22 0821   Resp 12 04/06/22 0823   SpO2 97 % 04/06/22 0821   Vitals shown include unvalidated device data. Patient Location: Endoscopy    Anesthesia Type: MAC    Airway Patency: patent    Postop Pain Control: adequate    Mental Status: mildly sedated but able to meaningfully participate in the post-anesthesia evaluation    Nausea/Vomiting: none    Cardiopulmonary/Hydration status: stable euvolemic    Complications: no apparent anesthesia related complications    Postop vital signs: stable    Dental Exam: Unchanged from Preop    Patient to be discharged from PACU when criteria met.

## 2022-10-20 ENCOUNTER — IMMUNIZATION (OUTPATIENT)
Dept: FAMILY MEDICINE CLINIC | Facility: CLINIC | Age: 52
End: 2022-10-20
Payer: COMMERCIAL

## 2022-10-20 DIAGNOSIS — Z23 NEED FOR VACCINATION: Primary | ICD-10-CM

## 2022-10-20 PROCEDURE — 90686 IIV4 VACC NO PRSV 0.5 ML IM: CPT | Performed by: FAMILY MEDICINE

## 2022-10-20 PROCEDURE — 90471 IMMUNIZATION ADMIN: CPT | Performed by: FAMILY MEDICINE

## 2023-04-17 ENCOUNTER — OFFICE VISIT (OUTPATIENT)
Dept: FAMILY MEDICINE CLINIC | Facility: CLINIC | Age: 53
End: 2023-04-17
Payer: COMMERCIAL

## 2023-04-17 VITALS
DIASTOLIC BLOOD PRESSURE: 62 MMHG | HEIGHT: 76 IN | RESPIRATION RATE: 16 BRPM | OXYGEN SATURATION: 98 % | BODY MASS INDEX: 37.8 KG/M2 | HEART RATE: 58 BPM | SYSTOLIC BLOOD PRESSURE: 110 MMHG | WEIGHT: 310.38 LBS | TEMPERATURE: 97 F

## 2023-04-17 DIAGNOSIS — Z00.00 ROUTINE ADULT HEALTH MAINTENANCE: Primary | ICD-10-CM

## 2023-04-17 DIAGNOSIS — Z12.5 SCREENING PSA (PROSTATE SPECIFIC ANTIGEN): ICD-10-CM

## 2023-04-17 DIAGNOSIS — E66.09 CLASS 2 OBESITY DUE TO EXCESS CALORIES WITHOUT SERIOUS COMORBIDITY WITH BODY MASS INDEX (BMI) OF 37.0 TO 37.9 IN ADULT: ICD-10-CM

## 2023-04-17 PROBLEM — R20.2 PARESTHESIAS IN LEFT HAND: Status: RESOLVED | Noted: 2019-02-02 | Resolved: 2023-04-17

## 2023-04-17 PROBLEM — M54.9 UPPER BACK PAIN ON LEFT SIDE: Status: RESOLVED | Noted: 2019-02-02 | Resolved: 2023-04-17

## 2023-04-17 PROBLEM — E66.01 CLASS 3 SEVERE OBESITY DUE TO EXCESS CALORIES WITHOUT SERIOUS COMORBIDITY WITH BODY MASS INDEX (BMI) OF 45.0 TO 49.9 IN ADULT (HCC): Status: RESOLVED | Noted: 2019-02-02 | Resolved: 2023-04-17

## 2023-04-17 PROBLEM — M25.512 LEFT SHOULDER PAIN: Status: RESOLVED | Noted: 2019-02-15 | Resolved: 2023-04-17

## 2023-04-17 PROBLEM — E66.812 CLASS 2 OBESITY DUE TO EXCESS CALORIES WITHOUT SERIOUS COMORBIDITY WITH BODY MASS INDEX (BMI) OF 37.0 TO 37.9 IN ADULT: Status: ACTIVE | Noted: 2019-02-02

## 2023-04-17 PROBLEM — E66.813 CLASS 3 SEVERE OBESITY DUE TO EXCESS CALORIES WITHOUT SERIOUS COMORBIDITY WITH BODY MASS INDEX (BMI) OF 45.0 TO 49.9 IN ADULT (HCC): Status: RESOLVED | Noted: 2019-02-02 | Resolved: 2023-04-17

## 2023-04-17 PROBLEM — E66.813 CLASS 3 SEVERE OBESITY DUE TO EXCESS CALORIES WITHOUT SERIOUS COMORBIDITY WITH BODY MASS INDEX (BMI) OF 45.0 TO 49.9 IN ADULT: Status: RESOLVED | Noted: 2019-02-02 | Resolved: 2023-04-17

## 2023-05-24 ENCOUNTER — OFFICE VISIT (OUTPATIENT)
Dept: FAMILY MEDICINE CLINIC | Facility: CLINIC | Age: 53
End: 2023-05-24
Payer: COMMERCIAL

## 2023-05-24 VITALS
OXYGEN SATURATION: 98 % | HEART RATE: 59 BPM | SYSTOLIC BLOOD PRESSURE: 122 MMHG | DIASTOLIC BLOOD PRESSURE: 82 MMHG | HEIGHT: 78 IN | WEIGHT: 290 LBS | BODY MASS INDEX: 33.55 KG/M2 | RESPIRATION RATE: 14 BRPM

## 2023-05-24 DIAGNOSIS — E66.09 CLASS 2 OBESITY DUE TO EXCESS CALORIES WITHOUT SERIOUS COMORBIDITY WITH BODY MASS INDEX (BMI) OF 37.0 TO 37.9 IN ADULT: Primary | ICD-10-CM

## 2023-05-24 PROCEDURE — 99214 OFFICE O/P EST MOD 30 MIN: CPT | Performed by: FAMILY MEDICINE

## 2023-05-24 PROCEDURE — 3074F SYST BP LT 130 MM HG: CPT | Performed by: FAMILY MEDICINE

## 2023-05-24 PROCEDURE — 3079F DIAST BP 80-89 MM HG: CPT | Performed by: FAMILY MEDICINE

## 2023-05-24 PROCEDURE — 3008F BODY MASS INDEX DOCD: CPT | Performed by: FAMILY MEDICINE

## 2023-11-08 ENCOUNTER — IMMUNIZATION (OUTPATIENT)
Dept: FAMILY MEDICINE CLINIC | Facility: CLINIC | Age: 53
End: 2023-11-08
Payer: COMMERCIAL

## 2023-11-08 DIAGNOSIS — Z23 NEED FOR VACCINATION: Primary | ICD-10-CM

## 2023-11-08 PROCEDURE — 90686 IIV4 VACC NO PRSV 0.5 ML IM: CPT | Performed by: FAMILY MEDICINE

## 2023-11-08 PROCEDURE — 90471 IMMUNIZATION ADMIN: CPT | Performed by: FAMILY MEDICINE

## 2024-03-14 NOTE — TELEPHONE ENCOUNTER
Pt noted to be hypotensive and minimally responsive to pain. Provider made aware.    Spoke to pt, states he is not having issues with his BP he is just curious. Advised he could go to any pharmacy to have it checked but not sure if machines are available due to Covid-19.  Pt states he will just call back to schedule appt and have BP checked

## 2024-05-03 ENCOUNTER — HOSPITAL ENCOUNTER (OUTPATIENT)
Dept: GENERAL RADIOLOGY | Age: 54
Discharge: HOME | End: 2024-05-03
Attending: FAMILY MEDICINE
Payer: COMMERCIAL

## 2024-05-03 ENCOUNTER — OFFICE VISIT (OUTPATIENT)
Dept: FAMILY MEDICINE CLINIC | Facility: CLINIC | Age: 54
End: 2024-05-03
Payer: COMMERCIAL

## 2024-05-03 VITALS
OXYGEN SATURATION: 99 % | DIASTOLIC BLOOD PRESSURE: 60 MMHG | BODY MASS INDEX: 37.96 KG/M2 | SYSTOLIC BLOOD PRESSURE: 98 MMHG | HEIGHT: 76.5 IN | TEMPERATURE: 98 F | RESPIRATION RATE: 18 BRPM | WEIGHT: 315 LBS | HEART RATE: 71 BPM

## 2024-05-03 DIAGNOSIS — M79.605 LEG PAIN, ANTERIOR, LEFT: ICD-10-CM

## 2024-05-03 DIAGNOSIS — M79.605 LEG PAIN, ANTERIOR, LEFT: Primary | ICD-10-CM

## 2024-05-03 PROCEDURE — 3078F DIAST BP <80 MM HG: CPT | Performed by: FAMILY MEDICINE

## 2024-05-03 PROCEDURE — 3008F BODY MASS INDEX DOCD: CPT | Performed by: FAMILY MEDICINE

## 2024-05-03 PROCEDURE — 73610 X-RAY EXAM OF ANKLE: CPT | Performed by: FAMILY MEDICINE

## 2024-05-03 PROCEDURE — 73590 X-RAY EXAM OF LOWER LEG: CPT | Performed by: FAMILY MEDICINE

## 2024-05-03 PROCEDURE — 3074F SYST BP LT 130 MM HG: CPT | Performed by: FAMILY MEDICINE

## 2024-05-03 PROCEDURE — 99214 OFFICE O/P EST MOD 30 MIN: CPT | Performed by: FAMILY MEDICINE

## 2024-05-06 ENCOUNTER — TELEPHONE (OUTPATIENT)
Dept: FAMILY MEDICINE CLINIC | Facility: CLINIC | Age: 54
End: 2024-05-06

## 2024-05-06 DIAGNOSIS — M79.605 LEG PAIN, ANTERIOR, LEFT: Primary | ICD-10-CM

## 2024-05-06 RX ORDER — MELOXICAM 15 MG/1
15 TABLET ORAL DAILY
Qty: 30 TABLET | Refills: 0 | Status: SHIPPED | OUTPATIENT
Start: 2024-05-06 | End: 2024-06-05

## 2024-05-06 RX ORDER — METHYLPREDNISOLONE 4 MG/1
TABLET ORAL
Qty: 21 EACH | Refills: 0 | Status: SHIPPED | OUTPATIENT
Start: 2024-05-06

## 2024-05-06 NOTE — TELEPHONE ENCOUNTER
Patient was seen on Friday by Kirby Had an xray as well. Patient would like to know what the plan is-further testing, medication, physical therapy? Patient has gone from being able to walk 12 miles to none at all. Please advise.

## 2024-05-06 NOTE — TELEPHONE ENCOUNTER
Kirby Faye, APRN  You2 minutes ago (1:11 PM)       I ll place order for PT and to see the ortho Doc that is also a podiatrist have him follow up with her. No more testing at this time.    Kirby

## 2024-05-06 NOTE — PROGRESS NOTES
CHIEF COMPLAINT:     Chief Complaint   Patient presents with    Leg Pain     Patient states he has been having left lower leg pain, possibly shin splint per patient. Patient wants to make sure there isn't a stress fracture.       HPI:   Sherman Foote is a 53 year old male. Patient's presenting with an injury to: LLE    Cause - Increase in activity, walking and exercising  Onset - about 2 weeks  Location of pain - lower left leg  Pain described as aching pain, worse with activity  Pain scale - 5/10  Radiation - up to knee and down to ankle  Pain is aggravated by movement, use and palpation  Pain is alleviated by massage compression rest, NSAIDS, immobilization and ice  Associated symptoms:  n/a  Care prior to arrival consisted of massage, compression, rest, NSAID, elevation and ice, with minimal relief.    No current outpatient medications on file.      Past Medical History:    Visual impairment    contacts/glasses      Social History:  Social History     Socioeconomic History    Marital status:    Tobacco Use    Smoking status: Never    Smokeless tobacco: Never   Vaping Use    Vaping status: Never Used   Substance and Sexual Activity    Alcohol use: Yes     Comment: occasionally    Drug use: No    Sexual activity: Yes        REVIEW OF SYSTEMS:   GENERAL HEALTH: feels well otherwise  SKIN: denies any unusual skin lesions or rashes, did have immediate swelling to area.   RESPIRATORY: denies shortness of breath with exertion  CARDIOVASCULAR: denies chest pain on exertion  GI: denies abdominal pain and denies heartburn  NEURO: denies numbness and tingling sensation.     EXAM:   BP 98/60 (BP Location: Left arm, Patient Position: Sitting, Cuff Size: adult)   Pulse 71   Temp 97.6 °F (36.4 °C) (Temporal)   Resp 18   Ht 6' 4.5\" (1.943 m)   Wt (!) 318 lb 6.4 oz (144.4 kg)   SpO2 99%   BMI 38.25 kg/m²   GENERAL: well developed, well nourished,in no apparent distress  SKIN: no rashes,no suspicious lesions,  skin's intact.   HEENT: atraumatic, normocephalic,ears and throat are clear  NECK: supple,no adenopathy,no bruits  LUNGS: clear to auscultation  CARDIO: RRR without murmur  GI: good BS's,no masses, HSM or tenderness  EXTREMITIES: no cyanosis, clubbing or edema  NEURO: normal sensation distally and normal cap refill distally.  Exam of LLE   - swelling: minor  - tenderness: moderate  - deformity/defect:  none obvious  - crepitus: none  - ecchymosis/bruising: none  - length/size (in cm): size of golf ball  - tendon / deep structures intact: yes  - Range of motion: normal   - pedal pulse: 2+  - sensation: intact  - Motor exam/strength/flex and ext: normal 5/5      ASSESSMENT AND PLAN:   Assessment:   Encounter Diagnosis   Name Primary?    Leg pain, anterior, left Yes         Plan: Compression, Brace, Massage  Rest, Ice, Elevation, NSAID. Reduce activity until injury slows or show improvement. Begin previous exercise schedule slowly, sigficantly decrease speed and distance for a few weeks when restarting.   Will xray to assess for stress fracture.  Likely tendonitis, shinsplints, or less likely stress fracture.     Will follow up with PT and or ortho if not improving.   IF XRAY show fracture, discussed need for immobilizing air cast and no weight bearing on that leg until see by ortho.      Follow-up with PCP for worsening symptoms or no improvement  Medication use, dose, and possible side effects discussed.    Meds & Refills for this Visit:  Requested Prescriptions      No prescriptions requested or ordered in this encounter           There are no Patient Instructions on file for this visit.        The patient and parents indicate understanding of these issues and agrees to the plan.  The patient indicates understanding of these issues and agrees to the plan.

## 2024-05-08 ENCOUNTER — OFFICE VISIT (OUTPATIENT)
Dept: ORTHOPEDICS CLINIC | Facility: CLINIC | Age: 54
End: 2024-05-08
Payer: COMMERCIAL

## 2024-05-08 VITALS — BODY MASS INDEX: 37.6 KG/M2 | HEIGHT: 76.5 IN | WEIGHT: 312 LBS

## 2024-05-08 DIAGNOSIS — S86.892A LEFT MEDIAL TIBIAL STRESS SYNDROME, INITIAL ENCOUNTER: Primary | ICD-10-CM

## 2024-05-08 DIAGNOSIS — M76.812 TIBIALIS ANTICUS TENDINITIS, LEFT: ICD-10-CM

## 2024-05-10 NOTE — H&P
Sports Medicine Clinic Note    Subjective:    Chief Complaint   Patient presents with    Leg or Foot Injury     Left shin pain; referral from   Onset - 2 wks ago, walking 12 miles a day and I felt pain  Pain score -1-2, peak - 9     Chief Complaint: Left shin pain    History of Present Illness: This is a very pleasant 53 year old year old male who presents with left shin pain, predominantly located along the medial aspect of the tibia as well as over anterior compartment musculature in the same leg. They report a gradual onset of pain over several weeks, exacerbated by long-distance walking. He has accomplished significant amounts of intentional weight loss attributed to exercise and dieting and long distance walking has been an integral part of this. Walks over 10 miles a day as part of this regimen but hasn't been able to due to this pain. The patient notes that the discomfort eases with rest but reoccurs upon resuming activity. They deny any recent trauma or falls.     Objective:    Left Lower Leg Examination:    Inspection: No significant swelling or deformity observed along the medial tibia, without noticeable bruising or deformity.    Palpation: Tenderness along the medial border of the tibia and the muscle belly of the tibialis anticus. No palpable warmth or crepitus.    Range of Motion: Full active and passive range of motion in the ankle and knee with pain reproduced on active ankle dorsiflexion in the anterior lower leg compartment.    Neurovascular: Distal pulses intact in both extremities. Sensory examination unremarkable.    Special Tests: Mild discomfort elicited during the shin splint test.    Imaging:    Radiographs of the left tibia/fibula and ankle were personally reviewed in the office, showing no fractures, dislocations, or periosteal reactions. Bone mineral density appears normal. Evidence of Achilles enthesopathy and calcaneal plantar spur which are not clinically relevant to his shin  pain.    Assessment:    1. Left medial tibial stress syndrome, initial encounter  2. Tibialis anticus tendinitis, left    Plan:    Agree that these are likely overuse injuries from his heavy loading through his exercise regimen. This may be compounded by his weight loss which is likely putting him in catabolysis and in fact patient has confirmed he is ketogenic with his diet efforts. We had a long discussion about ways to combat this. The patient is advised to temporarily modify impact activities such as running and heavy walking/hiking and replace them with low-impact exercises like swimming or cycling. Anti-inflammatory medication, like ibuprofen, is recommended for pain management. A referral to physical therapy is deferred for strengthening and stretching exercises focused on the lower limbs but can be considered in the future. Compression sleeves for the shins may provide symptomatic relief during activities. Consideration for an MRI of the tibias if symptoms persist despite conservative management but would not change initial management as above.    Tentative follow-up in 4-6 weeks or sooner if symptoms worsen.    Ermias Elliott DO, CAM   Primary Care Sports Medicine    Department of Orthopaedic Surgery  [address] [address]   [address] [place] [id number]    t: [phone]  f: [phone]      [facility]

## 2024-05-21 ENCOUNTER — TELEPHONE (OUTPATIENT)
Dept: PHYSICAL THERAPY | Facility: HOSPITAL | Age: 54
End: 2024-05-21

## 2024-05-23 ENCOUNTER — OFFICE VISIT (OUTPATIENT)
Dept: PHYSICAL THERAPY | Age: 54
End: 2024-05-23
Attending: FAMILY MEDICINE
Payer: COMMERCIAL

## 2024-05-23 DIAGNOSIS — M79.605 LEG PAIN, ANTERIOR, LEFT: Primary | ICD-10-CM

## 2024-05-23 PROCEDURE — 97161 PT EVAL LOW COMPLEX 20 MIN: CPT

## 2024-05-23 PROCEDURE — 97110 THERAPEUTIC EXERCISES: CPT

## 2024-05-23 NOTE — PROGRESS NOTES
LOWER EXTREMITY EVALUATION:     Diagnosis:   Leg pain, anterior, left (M79.605)       Referring Provider: Kirby Faye  Date of Evaluation:    5/23/2024    Precautions:  None Next MD visit:   none scheduled  Date of Surgery: n/a     PATIENT SUMMARY   Sherman Foote is a 53 year old male who presents to therapy today with complaints of left lower leg pain. Patient states over the last few years he has been exercises more often and trying to loose weight. Slowed down in the winter. Started back up again on 4/24/24 and he started to have sharp left shin pain. Took a few days off and the pain came back. Patient went and saw the doctor because he was worried about a stress fracture. Fracture was ruled out and was told he has shin splints. Patient states he stopped walking for a few weeks and now he is doing much better after the rest and was able to walk 5 miles yesterday without pain. No back pain, no shooting pain in leg, no pain/shooting/numbness in pain. Recently lost a lot of weight. Did take a steroid for the pain but stopped a while ago. Is also taking taking Meloxican.   Pt describes pain level current 0/10, at worst 8/10.   Pain location: L anterior shin; Pain Description sharp  Status (Improving/ unchanging/ worsening AND constant or intermittent): improving  Aggravating factors: walking  Alleviating factors: rest  Night pain: none  Numbness and Tingling: none  Occupation: Sales (mostly sitting and occasionally lifting)  Current functional limitations include prolonged walking   Sherman describes prior level of function Walks 9-10 miles a day. Pt goals include return to prior level of function without pain.  Past medical history was reviewed with Sherman. Significant findings include  has a past medical history of Visual impairment.    ASSESSMENT  Sherman presents to physical therapy evaluation with primary c/o left lower leg pain. The results of the objective tests and measures show decreased hip range  of motion,decreased lower extremity strength, tenderness to palpation and posture contributing to symptoms.  Functional deficits include but are not limited to prolonged walking. Pt and PT discussed evaluation findings, pathology, POC and HEP.  Pt voiced understanding and performs HEP correctly without reported pain. Skilled Physical Therapy is medically necessary to address the above impairments and reach functional goals.     OBJECTIVE:   Observation: slouched sitting posture, slight lower extremity edema  Palpation: TTP anterior tibialis muscle on left; no calf tenderness or pain  AROM: (* denotes performed with pain)  Hip Knee Foot/Ankle   ER R 41 deg; L 38 deg  Flexion: R 125 deg; L 0 deg  Extension: R 125 deg ; L 0 deg    DF: R WNL; L WNL ( no pain)  PF: R WNL; L WNL (No pain)  INV: R WNL; L WNL  EV: R WNL; L WNL     Flexibility:  Hip Flexor: R WNL, L WNL  Hamstrings: R WNL; L WNL  Piriformis: R decreased; L decreased  Quads: R WNL; L WNL  Gastroc-soleus: R WNL; L WNL    Strength/MMT: (* denotes performed with pain)  Hip Knee Foot/Ankle   Flexion: R 5/5; L 5/5  Extension: R 4-/5; L 4-/5  Abduction: R 4/5; L 4/5   Flexion: R 5/5; L 5/5  Extension: R 5/5; L 5/5    DF: R 5/5; L 5/5  PF: R 5/5; L 5/5  INV: R 5/5; L 5/5  EV: R 5/5; L 5/5       Special tests:   Repeated motion testing and Response (Lumbar):  Baseline: no pain  Repeated flexion in standing x 10 no pain  Repeated extension in standing x 10 no pain  Gait: pt ambulates on level ground with normal mechanics    Today’s Treatment and Response:   Pt education was provided on exam findings, treatment diagnosis, treatment plan, expectations, and prognosis. Pt was also provided recommendations for activity modifications, pain science education , detrimental fear avoidance behaviors, and importance of remaining active.  Patient was instructed in and issued a HEP for: tibilais anterior stretching, calf stretching, hip ER, sidelying hip abduction    Charges: PT  Eval Low Complexity, 1TE      Total Timed Treatment: 10 min     Total Treatment Time: 45 min     Based on clinical rationale and outcome measures, this evaluation involved Low Complexity decision making due to 1-2 personal factors/comorbidities, 3 body structures involved/activity limitations, and evolving symptoms including changing pain levels.  PLAN OF CARE:    Goals: (to be met in 6 visits)  Pt will be able to return to walking as previous level of function.   Pt will demonstrate increased hip ER/ABD strength to 5/5 to perform stepping and squatting activities without excessive femoral IR/ADD   Pt will be independent and compliant with comprehensive HEP to maintain progress achieved in PT     Frequency / Duration: Patient will be seen for 2 x/week or a total of 12 visits over a 90 day period. Treatment will include: Manual Therapy, Neuromuscular Re-education, Self-Care Home Management, Therapeutic Activities, Therapeutic Exercise, and Home Exercise Program instruction    Education or treatment limitation: None  Rehab Potential:excellent    LEFS Score  LEFS Score: 72.5 % (5/21/2024  7:56 PM)      Patient/Family/Caregiver was advised of these findings, precautions, and treatment options and has agreed to actively participate in planning and for this course of care.    Thank you for your referral. Please co-sign or sign and return this letter via fax as soon as possible to [phone]. If you have any questions, please contact me at Dept: [phone]    Sincerely,  Electronically signed by therapist: Monica Richards, PT, DPT, Cert.MDT  Physician's certification required: Yes  I certify the need for these services furnished under this plan of treatment and while under my care.    X___________________________________________________ Date____________________    Certification From: 5/23/2024  To:8/21/2024

## 2024-05-30 ENCOUNTER — APPOINTMENT (OUTPATIENT)
Dept: PHYSICAL THERAPY | Age: 54
End: 2024-05-30
Attending: FAMILY MEDICINE
Payer: COMMERCIAL

## 2024-06-07 ENCOUNTER — APPOINTMENT (OUTPATIENT)
Dept: PHYSICAL THERAPY | Age: 54
End: 2024-06-07
Attending: FAMILY MEDICINE
Payer: COMMERCIAL

## 2024-06-14 ENCOUNTER — APPOINTMENT (OUTPATIENT)
Dept: PHYSICAL THERAPY | Age: 54
End: 2024-06-14
Attending: FAMILY MEDICINE
Payer: COMMERCIAL

## 2024-06-19 ENCOUNTER — OFFICE VISIT (OUTPATIENT)
Dept: PHYSICAL THERAPY | Age: 54
End: 2024-06-19
Attending: FAMILY MEDICINE
Payer: COMMERCIAL

## 2024-06-19 PROCEDURE — 97110 THERAPEUTIC EXERCISES: CPT

## 2024-06-19 NOTE — PROGRESS NOTES
Diagnosis:   Leg pain , anterior , left       Referring Provider: Kirby Faye  Date of Evaluation:    5/23/2024    Precautions:  None Next MD visit:   none scheduled  Date of Surgery: n/a   Insurance Primary/Secondary: [employer] [place] / N/A     # Auth Visits: 6             Subjective: \" My left leg feels 100 % better since the star of PT and I am able to ambulate daily 5 mil daily for fitness without any pain . I would  like to hold PT for a month . I will continue with my current HEP at home . I will re-assess a need to continue my PT if I need to to advance my HEP at the time \".    Pain: 0/10      Objective:       Assessment:  reviewed current HEP with patient to ensure correct form of ex's performance . Patient was able to perform all give ex's with correct mm recruitment and without report of left lower leg pain .      Goals: ( to be met in 6 visits )  1.Pt will be able to return to walking as previous level of function.  2.Pt will demonstrate increased hip ER/ABD strength to 5/5 to perform stepping and squatting activities without excessive femoral IR/ADD .  3.Pt will be independent and compliant with comprehensive HEP to maintain progress achieved in PT .    Plan: as per patient request will hold PT sessions at this time .  Date: 6/19/2024  TX#: 2/6 Date:                 TX#: 3/ Date:                 TX#: 4/ Date:                 TX#: 5/ Date:   Tx#: 6/   HEP performance :       Standing :  B gastroc stretch on slant board x 5 with 20 sec hold   B toes raises x 20  B heel raises x 20   B heel raises with toes out 2 x 10   B heel raises with toes in 2 x 10  Alt hip flexion , abd , ext with RTB 2 x 10 each   Sidestepping with RTB 30 steps each dir   Monster walk with RTB 30 steps each dir with RTB                      HEP:     Charges: THERE EX X 3        Total Timed Treatment: 40 min  Total Treatment Time: 40 min

## 2025-04-17 ENCOUNTER — HOSPITAL ENCOUNTER (OUTPATIENT)
Dept: GENERAL RADIOLOGY | Age: 55
Discharge: HOME | End: 2025-04-17
Attending: FAMILY MEDICINE
Payer: COMMERCIAL

## 2025-04-17 ENCOUNTER — TELEPHONE (OUTPATIENT)
Dept: ORTHOPEDICS CLINIC | Facility: CLINIC | Age: 55
End: 2025-04-17

## 2025-04-17 ENCOUNTER — OFFICE VISIT (OUTPATIENT)
Dept: FAMILY MEDICINE CLINIC | Facility: CLINIC | Age: 55
End: 2025-04-17
Payer: COMMERCIAL

## 2025-04-17 VITALS
OXYGEN SATURATION: 98 % | RESPIRATION RATE: 16 BRPM | HEIGHT: 76.5 IN | HEART RATE: 63 BPM | DIASTOLIC BLOOD PRESSURE: 70 MMHG | TEMPERATURE: 98 F | WEIGHT: 315 LBS | SYSTOLIC BLOOD PRESSURE: 102 MMHG | BODY MASS INDEX: 37.96 KG/M2

## 2025-04-17 DIAGNOSIS — Z00.00 WELLNESS EXAMINATION: Primary | ICD-10-CM

## 2025-04-17 DIAGNOSIS — G89.29 CHRONIC PAIN OF BOTH KNEES: ICD-10-CM

## 2025-04-17 DIAGNOSIS — M25.561 CHRONIC PAIN OF BOTH KNEES: ICD-10-CM

## 2025-04-17 DIAGNOSIS — M25.551 PAIN OF RIGHT HIP: ICD-10-CM

## 2025-04-17 DIAGNOSIS — M25.562 CHRONIC PAIN OF BOTH KNEES: ICD-10-CM

## 2025-04-17 DIAGNOSIS — Z12.5 SCREENING FOR MALIGNANT NEOPLASM OF PROSTATE: ICD-10-CM

## 2025-04-17 PROCEDURE — 73560 X-RAY EXAM OF KNEE 1 OR 2: CPT | Performed by: FAMILY MEDICINE

## 2025-04-17 PROCEDURE — 73502 X-RAY EXAM HIP UNI 2-3 VIEWS: CPT | Performed by: FAMILY MEDICINE

## 2025-04-17 NOTE — TELEPHONE ENCOUNTER
Future Appointments   Date Time Provider Department Center   4/17/2025  5:45 PM PF XR RM1 [facility] [place]   4/17/2025  7:00 PM PF XR RM1 [facility] [place]   4/17/2025  7:15 PM PF XR RM1 [facility] [place]   4/21/2025  9:00 AM Luis Montero MD [facility]   5/5/2025  8:40 AM [facility] Calles [id number]     Please advise if patient needs right hip xrays.

## 2025-04-17 NOTE — PROGRESS NOTES
Wellness Exam    REASON FOR VISIT:    Sherman Foote is a 54 year old male who presents for an Annual Health Assessment.    Current Complaints: Mr. Foote is here for his wellness exam  Flu Shot: see immunization record  Health Maintenance Topics with due status: Overdue       Topic Date Due    Pneumococcal Vaccine: 50+ Years Never done    PSA 11/19/2023    Annual Physical 04/17/2024    COVID-19 Vaccine 09/01/2024    Annual Depression Screening 01/01/2025     Reported Health:   He is a pleasant 54-year-old gentleman here for his wellness exam.  He is generally healthy.  However he has been experiencing bilateral knee pain since the start of this year.  Most of the pain on the knees is located anterior aspect by his kneecap.  The left knee will occasionally give out.  No recent injury or trauma.  He also has been experiencing right hip pain which he describes as throbbing.  Again with no recent injury or trauma.  However since he has been laid off because of ongoing pain he had gained some weight.  He would easily lose weight if he exercises.  He was an offense of  in college.  He sustained ankle injury and had surgery for it while playing.  Otherwise he has not had any injuries thereafter.    I had reviewed past medical and family histories together with allergy and medication lists documented.    Details about the complaints:  N/A    General Health     How would you describe your current health state?: Good    Type of Diet: Balanced    How do you maintain positive mental well-being?: Social Interaction    How would you describe your daily physical activity?: Moderate    If you are a male age 45-79 or a female age 55-79, do you take aspirin?: No    Have you had any immunizations at another office such as Influenza, Hepatitis B, Tetanus, or Pneumococcal?: No    At any time do you feel concerned for the safety/well-being of yourself and/or your children, in your home or elsewhere?: No     CAGE:     Cut:  Have you ever felt you should Cut down on your drinking?: No    Annoyed: Have people Annoyed you by criticizing your drinking?: No    Guilty: Have you ever felt bad or Guilty about your drinking?: No    Eye Opener: Have you ever had a drink first thing in the morning to steady your nerves or to get rid of a hangover (Eye opener)?: No    Scoring  Total Score: 0     PHQ-4: Over the LAST 2 WEEKS       Depression Screening (PHQ-2/PHQ-9): Over the LAST 2 WEEKS   Little interest or pleasure in doing things (over the last two weeks)?: Not at all    Feeling down, depressed, or hopeless (over the last two weeks)?: Not at all    PHQ-2 SCORE: 0              PREVENTATIVE SERVICES  INDICATIONS AND SCHEDULE Recommendation Internal Lab or Procedure External Lab or Procedure   Colonoscopy Screen Every 10 years Health Maintenance   Topic Date Due    Colorectal Cancer Screening  04/06/2032       Flex Sigmoidoscopy Screen  Every 5 years No results found for this or any previous visit.    Fecal Occult Blood  Annually No results found for: \"FOB\", \"OCCULTSTOOL\"    Obesity Screening Screen all adults annually Body mass index is 42.53 kg/m².      Preventive Services for Which Recommendations Vary with Risk Recommendation Internal Lab or Procedure External Lab or Procedure   Cholesterol Screening Recommended screening varies with age, risk and gender LDL Cholesterol (mg/dL)   Date Value   11/19/2021 92       Diabetes Screening  If history of high blood pressure or other  risk factors No results found for: \"A1C\"  Glucose (mg/dL)   Date Value   11/19/2021 87         Gonorrhea Screening If high risk No results found for: \"GONOCOCCUS\"    HIV Screening For all adults age 18-65, older adults at increased risk No results found for: \"HIV\"    Syphilis Screening Screen if pregnant or high risk No results found for: \"RPR\"    Hepatitis C Screening Screen those at high risk plus screen one time for adults born 1944-1 9[birth year] No results found for: \"HCVAB\"     Tuberculosis Screen If high risk No components found for: \"PPDINDURAT\"      ALLERGIES:   Allergies[1]    CURRENT MEDICATIONS:   Current Medications[2]   MEDICAL INFORMATION:   Past Medical History[3]   Past Surgical History[4]   Family History[5]   SOCIAL HISTORY:   Short Social Hx on File[6]       REVIEW OF SYSTEMS:   Constitutional: Negative for fever, chills and fatigue.   HENT: Negative for hearing loss, congestion, sore throat and neck pain.    Eyes: Negative for pain and visual disturbance.   Respiratory: Negative for cough and shortness of breath.    Cardiovascular: Negative for chest pain and palpitations.   Gastrointestinal: Negative for nausea, vomiting, abdominal pain and diarrhea.   Genitourinary: Negative for urgency, frequency and difficulty urinating.   Musculoskeletal: Negative for arthralgias and no gait problem.   Skin: Negative for color change and rash.   Neurological: Negative for tremors, weakness and numbness.   Hematological: Negative for adenopathy. Does not bruise/bleed easily.   Psychiatric/Behavioral: Negative for confusion and agitation. The patient is not nervous/anxious.      EXAM:   /70   Pulse 63   Temp 97.9 °F (36.6 °C) (Temporal)   Resp 16   Ht 6' 4.5\" (1.943 m)   Wt (!) 354 lb (160.6 kg)   SpO2 98%   BMI 42.53 kg/m²   Constitutional: He appears well-developed, nourished, and his stated age. Vital signs reviewed  Pleasant man   Head: Normocephalic and atraumatic.   Ear: TMs visible and normal bilaterally  Nose: Nose normal.   Eyes: EOM are normal. Pupils are equal, round, and reactive to light. No scleral icterus.   Neck: Normal range of motion. No thyromegaly present.   Cardiovascular: Normal rate, regular rhythm and normal heart sounds.  Exam reveals no friction rub.    No murmur heard.  Pulmonary/Chest: Effort normal and breath sounds normal. He has no wheezes. He has no rales.   Abdominal: Soft. Bowel sounds are normal. There is no tenderness.   Musculoskeletal:  Normal range of motion. He exhibits no edema.   Examination of the left knee reveals mild swelling.  There was mild tenderness on the anterior aspect with crepitus.  There was good range of motion.  No erythema no warmth no swelling.  Negative anterior and posterior drawer  Examination of the right knee reveals no erythema no warmth.  There is mild swelling.  No tenderness noted.  No crepitus.  There was good range of motion.  Negative anterior posterior drawer  Examination of the right hip reveals no erythema no warmth no swelling.  There was good range of motion.  No tenderness.  Lymphadenopathy:     He has no cervical adenopathy.   Neurological: He is alert and oriented to person, place, and time. He has normal reflexes.   Skin: Skin is warm. No rash noted. No erythema. with normal hair  Psychiatric: He has a normal mood and affect. His behavior is normal.     ASSESSMENT AND OTHER RELEVANT CHRONIC CONDITIONS:   Sherman Foote is a 54 year old male who presents for an Annual Health Assessment.   1. Wellness examination    2. Screening for malignant neoplasm of prostate    3. Pain of right hip    4. Chronic pain of both knees        PLAN SUMMARY:   Sherman Foote is a 54 year old male  Age appropriate cancer screening, labs, safety, immunizations were discussed with the patient and ordered as follows:  Diagnoses and all orders for this visit:    Wellness examination  -     CBC With Differential With Platelet; Future  -     Comp Metabolic Panel (14); Future  -     Lipid Panel; Future  -     TSH and Free T4; Future    Screening for malignant neoplasm of prostate  -     PSA Total, Screen; Future    Pain of right hip  -     XR HIP W OR WO PELVIS 2 OR 3 VIEWS, RIGHT (CPT=73502); Future  -     Ortho Referral - In Network    Chronic pain of both knees  -     XR KNEE (1 OR 2 VIEWS), RIGHT (CPT=73560); Future  -     XR KNEE (1 OR 2 VIEWS), LEFT (CPT=73560); Future  -     Ortho Referral - In Network    Will order  corresponding x-rays and will await results.  We will refer to orthopedist for further evaluation and will await recommendations  Differentials include arthritis, inflammatory, musculoskeletal  Will possibly need physical therapy  Will check routine labs and will notify him once we get test results.  Follow-up in 1 year as needed    This note was prepared using Dragon Medical voice recognition dictation software. As a result errors may occur. When identified these errors have been corrected. While every attempt is made to correct errors during dictation discrepancies may still exist            Orders Placed This Encounter   Procedures    CBC With Differential With Platelet    Comp Metabolic Panel (14)    Lipid Panel    TSH and Free T4    PSA Total, Screen       Imaging & Consults:  ORTHOPEDIC - INTERNAL  XR HIP W OR WO PELVIS 2 OR 3 VIEWS, RIGHT (CPT=73502)  XR KNEE (1 OR 2 VIEWS), RIGHT (CPT=73560)  XR KNEE (1 OR 2 VIEWS), LEFT (CPT=73560)    His 5 year prevention plan includes: annual visits for fasting labs  Health Maintenance   Topic Date Due    Pneumococcal Vaccine: 50+ Years (1 of 1 - PCV) Never done    PSA  11/19/2023    Annual Physical  04/17/2024    COVID-19 Vaccine (5 - 2024-25 season) 09/01/2024    Annual Depression Screening  01/01/2025    Influenza Vaccine (Season Ended) 10/01/2025    DTaP,Tdap,and Td Vaccines (2 - Td or Tdap) 06/21/2031    Colorectal Cancer Screening  04/06/2032    Zoster Vaccines  Completed    Meningococcal B Vaccine  Aged Out     Patient/Caregiver Education:  Patient/Caregiver Education: There are no barriers to learning. Medical education done.  Outcome: Patient verbalizes understanding.    Educated by: MD   The patient indicates understanding of these issues and agrees to the plan.    SUGGESTED VACCINATIONS - Influenza, Pneumococcal, Zoster, Tetanus     Immunization History   Administered Date(s) Administered    Covid-19 Vaccine Moderna 100 mcg/0.5 ml 01/31/2021, 02/28/2021,  10/28/2021    Covid-19 Vaccine Moderna Bivalent 50mcg/0.5mL 12+ years 10/19/2022    FLULAVAL 6 months & older 0.5 ml Prefilled syringe (64801) 11/19/2021, 10/20/2022    FLUZONE 6 months and older PFS 0.5 ml (01434) 11/08/2023    Influenza 03/04/2020    TDAP 06/21/2021    Zoster Vaccine Recombinant Adjuvanted (Shingrix) 11/19/2021, 01/21/2022       Influenza Annually   Pneumococcal if high risk   Td/Tdap once then every 10 years   HPV Males 11-21   Zoster (Shingles) 60 and older: one dose   Varicella 2 doses if not immune   MMR 1-2 doses if born after 1955 and not immune     Problem List[7]  PREVENTIVE VISIT,EST,40-64         [1] No Known Allergies  [2]   No current outpatient medications on file.   [3]   Past Medical History:   Visual impairment    contacts/glasses   [4]   Past Surgical History:  Procedure Laterality Date    Ankle fracture surgery      Colonoscopy N/A 4/6/2022    Procedure: COLONOSCOPY;  Surgeon: Noe Joya DO;  Location:  ENDOSCOPY   [5] No family history on file.  [6]   Social History  Socioeconomic History    Marital status:    Tobacco Use    Smoking status: Never    Smokeless tobacco: Never   Vaping Use    Vaping status: Never Used   Substance and Sexual Activity    Alcohol use: Yes     Comment: occasionally    Drug use: No    Sexual activity: Yes   [7]   Patient Active Problem List  Diagnosis    Class 2 obesity due to excess calories without serious comorbidity with body mass index (BMI) of 37.0 to 37.9 in adult

## 2025-04-17 NOTE — TELEPHONE ENCOUNTER
MARIANNE knee xrays with two views have been completed today. We can use this for his upcoming appointment with Dr Wolf, no additional imaging needed as of this date.

## 2025-04-17 NOTE — PATIENT INSTRUCTIONS
Thank you for choosing Josue Chester MD at [facility]  To Do: Sherman Foote  1. Please see age appropriate health prevention below     Call [phone] to schedule the appointment.   Please signup for [employer], which is electronic access to your record if you have not done so.  All your results will post on there.  [email or web address]   You can NOW use [employer] to book your appointments with us, or consider using open access scheduling which is through the [place] website [email or web address] and type in Josue Chester MD and follow the links for \"Schedule Online Now\"    To schedule Imaging or tests at [facility] call Central Scheduling [phone], Go to [facility] ER Building (For example: CT scans, X rays, Ultrasound, MRI)  Cardiac Testing in ER building Building A second floor Cardiac Testing [phone] (For example: Holter Monitor, Cardiac Stress tests,Event Monitor, or 2D Echocardiograms)  [facility] call [phone] usually in [facility]  Walk in Clinic in [place] at [address] Mon-Fri at 8am-7:30 p.m., and Sat/Sun 9:00a.m.-4:30 p.m.  Also at [address] [place] [address]  Call [phone] for info     Please call our office about any questions regarding your treatment/medicines/tests as a result of today's visit.  For your safety, read the entire package insert of all medicines prescribed to you and be aware of all of the risks of treatment even beyond those discussed today.  All therapies have potential risk of harm or side effects or medication interactions.  It is your duty and for your safety to discuss with the pharmacist and our office with questions, and to notify us and stop treatment if problems arise, but know that our intention is that the benefits outweigh those potential risks and we strive to make you healthier and to improve your quality of life.    Referrals, and Radiology Information:    If your insurance requires a referral to a specialist,  please allow 5 business days to process your referral request.    If Josue Chester MD orders a CT or MRI, it may take up to 10 business days to receive approval from your insurance company. Once our office has called informing you that the insurance company approved your testing, please call Central Scheduling at [phone]  Please allow our office 5 business days to contact you regarding any testing results.    Refill policies:   Allow 3 business days for refills; controlled substances may take longer and must be picked up from the office in person.  Narcotic medications can only be filled in 30 day increments and must be refilled at an office visit only.  If your prescription is due for a refill, you may be due for a follow-up appointment.  We cannot refill your maintenance medications at a preventative wellness visit.  To best provide you care, patients receiving maintenance medications need to be seen at least twice a year.

## 2025-04-17 NOTE — TELEPHONE ENCOUNTER
Future Appointments   Date Time Provider Department Center   4/17/2025  5:45 PM PF XR RM1 PF XRAY [place]   4/17/2025  7:00 PM PF XR RM1 PF XRAY [place]   4/17/2025  7:15 PM PF XR RM1 PF XRAY [place]   4/21/2025  9:00 AM Luis Montero MD [facility]   5/5/2025  8:40 AM [id number]     Please advise if patient needs alonso knee xrays.

## 2025-04-18 NOTE — TELEPHONE ENCOUNTER
XR Right Hip 4/17/25  CONCLUSION:     Negative for fracture or malalignment of the pelvis or hips.  Minimal osteoarthritis of the left hip.  Right hip joint space is preserved.  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  Obturator rings are intact.  Normal sacroiliac joints and pubic symphysis.

## 2025-04-21 ENCOUNTER — OFFICE VISIT (OUTPATIENT)
Dept: ORTHOPEDICS CLINIC | Facility: CLINIC | Age: 55
End: 2025-04-21
Payer: COMMERCIAL

## 2025-04-21 VITALS — BODY MASS INDEX: 37.96 KG/M2 | WEIGHT: 315 LBS | HEIGHT: 76.5 IN

## 2025-04-21 DIAGNOSIS — M25.562 CHRONIC PAIN OF LEFT KNEE: ICD-10-CM

## 2025-04-21 DIAGNOSIS — M25.551 RIGHT HIP PAIN: Primary | ICD-10-CM

## 2025-04-21 DIAGNOSIS — G89.29 CHRONIC PAIN OF LEFT KNEE: ICD-10-CM

## 2025-04-21 NOTE — PROGRESS NOTES
Orthopaedic Surgery  [address] [address] [address]   [phone]      Chief Complaint:  Right Hip Pain  Left Knee Pain    History of Present Illness  Sherman Foote is a 54-year-old male who presents with chronic right hip and left knee pain.    He has been experiencing persistent hip pain, described as a throbbing sensation located in the hip area, particularly at night. The pain worsens with prolonged standing, such as during a recent volleyball tournament, and he sometimes needs to adjust his sleeping position to alleviate discomfort. Occasionally, he experiences tingling in the leg, but it does not extend past the knee. No pain in the groin area or radiation down the leg otherwise is noted.    Chronic knee pain is also a significant concern, described as sore all the time, particularly underneath the kneecap. The pain worsens with activity, and he has not been able to go on his regular walks for two and a half months, which has been challenging. He describes episodes where the knee 'gives out' and 'locks up,' causing significant pain and instability. Despite having a high pain tolerance, the pain can be severe enough to make him jump.    He has a history of significant weight loss over the past three years, dropping from nearly 500 pounds to 295 pounds. He maintains a weight between 305 and 318 pounds. Due to the pain, he has not been able to engage in his usual physical activities, such as walking nine miles a day, and expresses concern about the impact on his ability to remain active and participate in activities he enjoys, such as hiking and kayaking.    He has a history of shin splints diagnosed approximately a year ago, for which he received physical therapy and was advised to rest. Initially, he feared a stress fracture due to the severity of the pain. He also has a history of playing football and has experienced various injuries over the years, including having screws in his  tibia. He recalls a specific incident where he slipped on ice, which may have contributed to his knee issues.      PMH/PSH/Family History/Social History/Meds/Allergies:   Past Medical History[1]     Past Surgical History[2]     Family History[3]     Social History     Socioeconomic History    Marital status:      Spouse name: Not on file    Number of children: Not on file    Years of education: Not on file    Highest education level: Not on file   Occupational History    Not on file   Tobacco Use    Smoking status: Never    Smokeless tobacco: Never   Vaping Use    Vaping status: Never Used   Substance and Sexual Activity    Alcohol use: Yes     Comment: occasionally    Drug use: No    Sexual activity: Yes   Other Topics Concern    Not on file   Social History Narrative    Not on file     Social Drivers of Health     Food Insecurity: Not on file   Transportation Needs: Not on file   Stress: Not on file   Housing Stability: Not on file        No current outpatient medications     Allergies[4]       Physical Exam:   Vitals:    04/21/25 0913   Weight: (!) 355 lb (161 kg)   Height: 6' 4.5\" (1.943 m)     Estimated body mass index is 42.65 kg/m² as calculated from the following:    Height as of this encounter: 6' 4.5\" (1.943 m).    Weight as of this encounter: 355 lb (161 kg).    Constitutional: No acute distress, well nourished.  Eyes: Anicteric sclera, pink conjunctiva  Ears, Nose, Mouth and Throat: Normocephalic, atraumatic, moist mucous membranes  Cardiovascular: No pitting edema or varicosities in the lower extremities  Respiratory: No respiratory distress, normal respiratory rhythm and effort   Neurological:  Oriented to person, place, and time.  Psychological:  Appropriate mood and affect.    Right Hip Exam:  No TTP at the greater trochanter, abductors, or IT band  Tolerates full range of motion of the hip without pain  Flexion 110 degrees  External rotation 30 degrees  Internal rotation 10 degrees  Negative  Stinchfield test    Left Knee Exam:      Inspection: No erythema, ecchymoses, or wounds. No rash. No previous incisions noted. No effusion. No quad atrophy  Alignment: neutral  ROM: 0 - 120 degrees, flexion contracture: 0 degrees, quad lag: no  Stability: A/P stress: stable, firm endpoint, M/L stress: stable, firm endpoint  Pain or crepitus with ROM?: Yes. No pain with patellar grind  Non-tender at: medial joint line, lateral joint line, patella, patellar tendon, quadriceps tendon  Strength: Intact 5/5 strength SLR and TA/GS/FHL/EHL  Sensation: Grossly intact to light touch over SPN/DPN/Saph/Sural/Tibial nerve distributions  Vasc: Warm perfused extremity        Imaging:   XRs of the bilateral knees from 4/17/25 were reviewed with AP and lateral views    They show mild degenerative changes of the knee involving the medial compartment(s) with joint space narrowing and subchondral sclerosis. No fracture or dislocation seen    XRs AP Pelvis and 2 views AP and Lat of the right Hip were personally reviewed and interpreted    There are mild degenerative changes of the hip with loss of joint space on the LEFT. Right hip with well maintained joint space  No fracture or dislocation noted    I personally reviewed and interpreted the radiographs.      Assessment:     ICD-10-CM    1. Right hip pain  M25.551 OP REFERRAL TO [facility]      2. Chronic pain of left knee  M25.562 MRI KNEE, LEFT ([id number])    G89.29        Likely lumbar radiculopathy    Plan:   Patient with right hip pain located in the posterior hip  Likely lumbar radiculopathy  He has no anterior groin pain and full range of motion of the hip on exam without pain  He also has excellent well-maintained joint space on x-ray of the right symptomatic hip with mild hip arthritis on the left  I recommend starting with a course of physical therapy and a referral was provided for him today    For the left knee he does complain of mechanical symptoms of  locking with a remote history of injury  I would like to obtain an MRI of the knee to rule out a meniscus tear  He also may have a component of patellar tendinitis and tendinitis involving the biceps femoris tendon based on the location of his pain    Will plan to follow-up after the MRI is obtained      Thank you very much for allowing me to participate in the care of this patient. If you have any questions, please do not hesitate to contact me.      Luis Montero MD  Adult Hip and Knee Reconstruction    Department of Orthopaedic Surgery  [facility]     [address] [place] [address]  [address] [place] [address]     t: [phone]  f: [phone]       [facility]    The following individual(s) verbally consented to be recorded using ambient AI listening technology and understand that they can each withdraw their consent to this listening technology at any point by asking the clinician to turn off or pause the recording:    Patient name: Sherman BALTAZAR Qamar Oatesidge tool was used for dictation purposes only and the patient was not recorded at any point during the visit.           [1]   Past Medical History:   Visual impairment    contacts/glasses   [2]   Past Surgical History:  Procedure Laterality Date    Ankle fracture surgery      Colonoscopy N/A 4/6/2022    Procedure: COLONOSCOPY;  Surgeon: Noe Joya DO;  Location:  ENDOSCOPY   [3] History reviewed. No pertinent family history.  [4] No Known Allergies

## 2025-04-22 ENCOUNTER — HOSPITAL ENCOUNTER (OUTPATIENT)
Dept: MRI IMAGING | Facility: HOSPITAL | Age: 55
Discharge: HOME | End: 2025-04-22
Attending: STUDENT IN AN ORGANIZED HEALTH CARE EDUCATION/TRAINING PROGRAM
Payer: COMMERCIAL

## 2025-04-22 DIAGNOSIS — M25.562 CHRONIC PAIN OF LEFT KNEE: ICD-10-CM

## 2025-04-22 DIAGNOSIS — G89.29 CHRONIC PAIN OF LEFT KNEE: ICD-10-CM

## 2025-04-22 PROCEDURE — 73721 MRI JNT OF LWR EXTRE W/O DYE: CPT | Performed by: STUDENT IN AN ORGANIZED HEALTH CARE EDUCATION/TRAINING PROGRAM

## 2025-04-24 ENCOUNTER — TELEPHONE (OUTPATIENT)
Facility: CLINIC | Age: 55
End: 2025-04-24

## 2025-04-24 NOTE — TELEPHONE ENCOUNTER
Patient spoke with Dr. Montero about his MRI results. Dr. Montero told him to get schedule with Dr. Calles for his left knee tear. Patient scheduled for May 21st but would like to know if he needs to be seen sooner. Patient would like a call back if he can be seen sooner or if he is fine being seen on May 21. He would also like to know what he should and shouldn't be doing for the mean time.    MRI was done 4/22/25 in [facility].    Future Appointments   Date Time Provider Department Center   5/6/2025  8:45 AM Laura Johnston, PT PF PT [place]   5/13/2025  8:45 AM Laura Johnston, PT PF PT [place]   5/15/2025  8:45 AM Laura Johnston, PT PF PT [place]   5/19/2025  9:30 AM Sabrina Cohen, PTA PF PT [place]   5/21/2025  9:20 AM Anna Calles [id number]   5/22/2025  8:45 AM Laura Johnston, PT PF PT [place]       Please advise.

## 2025-04-24 NOTE — TELEPHONE ENCOUNTER
Do you want to see prior to next available(5/21/25) for possible surgical discussion for torn meniscus.   Dr. Montero referral     CONCLUSION:    1. There is a full thickness radial tear along the junction of the posterior horn and posterior root ligament of the medial meniscus.  Secondary mild peripheral extrusion of medial meniscal tissue with grade 1 chronic MCL sprain.   2. Diffuse mucinous degenerative changes of the ACL noted without evidence of a paco tear or disruption.   3. Mild chondromalacia of the patellofemoral and medial femoral tibial compartments without paco osteoarthritis.   4. Mild joint effusion.   5. Mild distal quadriceps tendinosis.       Future Appointments   Date Time Provider Department Center   5/6/2025  8:45 AM Laura Johnston, PT PF PT [place]   5/13/2025  8:45 AM Laura Johnston, PT PF PT [place]   5/15/2025  8:45 AM Laura Johnston, PT PF PT [place]   5/19/2025  9:30 AM Sabrina Cohen, PTA PF PT [place]   5/21/2025  9:20 AM Anna Calles MD [id number]

## 2025-04-28 ENCOUNTER — TELEPHONE (OUTPATIENT)
Facility: CLINIC | Age: 55
End: 2025-04-28

## 2025-04-28 NOTE — TELEPHONE ENCOUNTER
Spoke with patient directly and answered questions.  He does not have specific restrictions, told patient that his body is his guide and should temper activity when he feels pain.   Patient grateful for call and discussion, will call back with further questions.

## 2025-04-28 NOTE — TELEPHONE ENCOUNTER
Please advise pt saw Dr Montero on 4/21 for rt hip pain,but the pt has knee pain as well. Pt wants to know what are his restrictions for his hips and knees. Please f/u with pt [phone]  Future Appointments   Date Time Provider Department Center   5/6/2025  8:45 AM Laura Johnston, PT PF PT [place]   5/13/2025  8:45 AM Laura Johnston, PT PF PT [place]   5/15/2025  8:45 AM Laura Johnston, PT PF PT [place]   5/19/2025  9:30 AM Sabrina Cohen, PTA PF PT [place]   5/21/2025  9:20 AM [id number]   5/22/2025  8:45 AM Laura Johnston, PT PF PT [place]

## 2025-05-05 ENCOUNTER — OFFICE VISIT (OUTPATIENT)
Dept: ORTHOPEDICS CLINIC | Facility: CLINIC | Age: 55
End: 2025-05-05
Payer: COMMERCIAL

## 2025-05-05 ENCOUNTER — TELEPHONE (OUTPATIENT)
Facility: CLINIC | Age: 55
End: 2025-05-05

## 2025-05-05 VITALS — HEIGHT: 76 IN | BODY MASS INDEX: 38.36 KG/M2 | WEIGHT: 315 LBS

## 2025-05-05 DIAGNOSIS — M94.262 CHONDROMALACIA OF LEFT KNEE: ICD-10-CM

## 2025-05-05 DIAGNOSIS — S83.242A OTHER TEAR OF MEDIAL MENISCUS OF LEFT KNEE AS CURRENT INJURY, INITIAL ENCOUNTER: Primary | ICD-10-CM

## 2025-05-05 DIAGNOSIS — M23.91 KNEE LOCKING, RIGHT: ICD-10-CM

## 2025-05-05 DIAGNOSIS — M23.322 DERANGEMENT OF POSTERIOR HORN OF MEDIAL MENISCUS OF LEFT KNEE: ICD-10-CM

## 2025-05-05 PROCEDURE — 3008F BODY MASS INDEX DOCD: CPT | Performed by: ORTHOPAEDIC SURGERY

## 2025-05-05 PROCEDURE — 99215 OFFICE O/P EST HI 40 MIN: CPT | Performed by: ORTHOPAEDIC SURGERY

## 2025-05-05 NOTE — H&P
Orthopaedic Surgery  [address] [place] [id number]  [phone]     NEW PATIENT VISIT - HISTORY AND PHYSICAL EXAMINATION     Name: Sherman Foote   MRN: [id number]  Date: 5/5/2025     CC: Left Knee Pain     History of Present Illness  Sherman Foote is a 54 year old male who presents with left knee pain. He was referred by Dr. Montero for assessment of a meniscus root tear.    He has been experiencing left knee pain since November 2024, which has been gradually worsening. The pain ranges from 5 to 8 out of 10 in intensity and is accompanied by a sensation of locking. It is primarily located underneath the kneecap and worsens with activity, particularly affecting his ability to walk for prolonged periods, but improves with rest.    Additional symptoms include soreness and swelling behind the kneecap, and difficulty lifting his leg into his truck due to pain in the back of the knee. He experiences throbbing pain in both knees, particularly in the morning and at night. He does not take any pain medication currently and has a high pain tolerance.    He has a history of significant weight loss during the COVID-19 pandemic, losing about 200 pounds, but has since regained some weight, fluctuating by about 25 pounds. He is currently following a keto diet. He used to walk nine miles a day and has a history of playing collegiate football, which he believes has contributed to wear and tear on his knees. He has three screws in his tibia from a high school injury.    He has been evaluated by another doctor who suspects a pinched nerve in his hip, and he is undergoing physical therapy for this issue. An MRI of his left knee showed a disruption in the medial meniscus root.    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 He enjoys walking and has a history of being very active, including playing sports and walking extensively.      PMH:   Past Medical History[1]    PAST SURGICAL HX:  Past Surgical  History[2]    FAMILY HX:  Family History[3]    ALLERGIES:  Patient has no known allergies.    MEDICATIONS: Current Medications[4]    ROS: A comprehensive 14 point review of systems was performed and was negative aside from the aforementioned per history of present illness.    SOCIAL HX:  Social History     Tobacco Use    Smoking status: Never    Smokeless tobacco: Never   Substance Use Topics    Alcohol use: Yes     Comment: occasionally       PE:   Vitals:    05/05/25 0926   Weight: (!) 350 lb (158.8 kg)   Height: 6' 4\" (1.93 m)     Estimated body mass index is 42.6 kg/m² as calculated from the following:    Height as of this encounter: 6' 4\" (1.93 m).    Weight as of this encounter: 350 lb (158.8 kg).    Physical Exam  Constitutional:       Appearance: Normal appearance.   HENT:      Head: Normocephalic and atraumatic.   Eyes:      Extraocular Movements: Extraocular movements intact.   Neck:      Musculoskeletal: Normal range of motion and neck supple.   Cardiovascular:      Pulses: Normal pulses.   Pulmonary:      Effort: Pulmonary effort is normal. No respiratory distress.   Abdominal:      General: There is no distension.   Skin:     General: Skin is warm.      Capillary Refill: Capillary refill takes less than 2 seconds.      Findings: No bruising.   Neurological:      General: No focal deficit present.      Mental Status: Alert.   Psychiatric:         Mood and Affect: Mood normal.     Examination of the left knee demonstrates:   Skin is intact, warm and dry.   Atrophy: none    Effusion: none    Joint line tenderness:  vague anterior discomfort  Crepitation: none   Gonzalo:  Equivocal    Patellar mobility: normal without apprehension  J-sign: none    ROM: Extension lacking less than 5 degrees  Flexion 140 degrees  ACL:  Negative Lachman, Negative Pivot Shift   PCL:  Negative Posterior Drawer  Collateral Ligaments: Stable to Varus and Valgus stress at 0 and 30 degrees  Strength: normal   Hip joint: normal  pain-free ROM   Gait:  normal   Leg length: equal and symmetric  Alignment:  neutral     No obvious peripheral edema noted.   Distal neurovascular exam demonstrates normal perfusion, intact sensation to light touch and full strength.     Examination of the contralateral knee demonstrates:  No significant atrophy, swelling or effusion. Full range of motion. Neurovascularly intact distally    Radiographic Examination/Diagnostics:  Knee XR personally viewed, independently interpreted and radiology report was reviewed.    MRI KNEE, LEFT (HXE=14912)  Result Date: 4/22/2025  PROCEDURE:  MRI KNEE, LEFT (SVK=54765)  COMPARISON:  [facility], XR, XR KNEE (1 OR 2 VIEWS), LEFT (CPT=73560), 4/17/2025, 1:16 PM.  INDICATIONS:  G89.29 Chronic pain of left knee M25.562 Chronic pain of left knee  TECHNIQUE:  Axial, coronal, and sagittal proton density with and without fat saturation images were obtained.  PATIENT STATED HISTORY: (As transcribed by Technologist)  Left anterior and posterior knee pain    FINDINGS:  LIGAMENTS:          There are diffuse mucinous degenerative changes of the ACL which is otherwise intact.  The PCL and collateral ligaments are intact with grade 1 chronic sprain of the proximal, superficial MCL fibers which appears slightly thickened without abnormal signal.  The patellofemoral ligaments are unremarkable. MENISCI:            The medial meniscus reveals a full thickness radial tear along the junction of the posterior horn and posterior root ligament.  There is secondary peripheral extrusion of medial meniscal tissue.  The lateral meniscus is unremarkable. TENDONS:            There is mild distal quadriceps tendinosis.  No high-grade tendinopathy about the knee is noted. MUSCULATURE:        No evidence of strain, edema, or atrophy. BONY COMPARTMENTS:  No acute osseous injuries are noted.  There is mild chondromalacia of the lateral patellar facet as well as mild chondromalacia of the medial femoral tibial  compartment.  No paco osteoarthritis about the knee is noted. SYNOVIUM:           There is a mild joint effusion.             CONCLUSION:  1. There is a full thickness radial tear along the junction of the posterior horn and posterior root ligament of the medial meniscus.  Secondary mild peripheral extrusion of medial meniscal tissue with grade 1 chronic MCL sprain. 2. Diffuse mucinous degenerative changes of the ACL noted without evidence of a paco tear or disruption. 3. Mild chondromalacia of the patellofemoral and medial femoral tibial compartments without paco osteoarthritis. 4. Mild joint effusion. 5. Mild distal quadriceps tendinosis.   LOCATION:  [facility]          Dictated by (CST): Maximo Crystal DO on 4/22/2025 at 10:33 AM     Finalized by (CST): Maximo Crystal DO on 4/22/2025 at 10:38 AM       XR KNEE (1 OR 2 VIEWS), LEFT (CPT=73560)  Result Date: 4/17/2025  PROCEDURE:  XR KNEE (1 OR 2 VIEWS), LEFT (CPT=73560)  COMPARISON:  None.  INDICATIONS:  G89.29 Chronic pain of both knees M25.562 Chronic pain of both knees M25.561 Chronic pain of both knees  PATIENT STATED HISTORY: (As transcribed by Technologist)  Patient has been walking about 9 miles/day for the past couple of years.  He complains of soreness to his bilateral anterior knees. He has increased pain to the left knee with full extension.  He also feels as if his left knee \"locks up\" on him intermittently.                CONCLUSION:  Negative for fracture or malalignment.  Normal mineralization.  Joint spaces are preserved.  No patellar subluxation.  No joint effusion or focal soft tissue swelling.     LOCATION:  [id number]   Dictated by (CST): Jeet Rubin MD on 4/17/2025 at 1:35 PM     Finalized by (CST): Jeet Rubin MD on 4/17/2025 at 1:39 PM       XR KNEE (1 OR 2 VIEWS), RIGHT (CPT=73560)  Result Date: 4/17/2025  PROCEDURE:  XR KNEE (1 OR 2 VIEWS), RIGHT (CPT=73560)  COMPARISON:  None.  INDICATIONS:  G89.29 Chronic pain of both knees M25.562 Chronic  pain of both knees M25.561 Chronic pain of both knees  PATIENT STATED HISTORY: (As transcribed by Technologist)  Patient has been walking about 9 miles/day for the past couple of years.  He complains of soreness to his bilateral anterior knees.               CONCLUSION:  Negative for fracture or malalignment.  Normal mineralization.  Joint spaces are preserved.  No joint effusion or focal soft tissue swelling.   LOCATION:  [id number]   Dictated by (CST): Jeet Rubin MD on 4/17/2025 at 1:34 PM     Finalized by (CST): Jeet Rubin MD on 4/17/2025 at 1:35 PM       XR HIP W OR WO PELVIS 2 OR 3 VIEWS, RIGHT (CPT=73502)  Result Date: 4/17/2025  PROCEDURE:  XR HIP W OR WO PELVIS 2 OR 3 VIEWS, RIGHT ( CPT=73502)  TECHNIQUE:  Unilateral 2 to 3 views of the hip and pelvis if performed.  COMPARISON:  None.  INDICATIONS:  M25.551 Pain of right hip  PATIENT STATED HISTORY: (As transcribed by Technologist)  Patient has been walking about 9 miles/day for the past couple of years.  He complains of pain/soreness to the lateral aspect of his right hip.               CONCLUSION:   Negative for fracture or malalignment of the pelvis or hips.  Minimal osteoarthritis of the left hip.  Right hip joint space is preserved.  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  Obturator rings are intact.  Normal sacroiliac joints and pubic symphysis.   LOCATION:  [id number]   Dictated by (CST): Jeet Rubin MD on 4/17/2025 at 1:30 PM     Finalized by (CST): Jeet Rubin MD on 4/17/2025 at 1:31 PM         IMPRESSION: Sherman Foote is a 54 year old male with left knee full-thickness medial meniscus radial root tear with an indeterminate chronicity.    PLAN:   We had a detailed discussion outlining the etiology, anatomy, pathophysiology, and natural history of meniscus pathology. Imaging was reviewed in detail and correlated to a 3-dimensional model of the  knee.     We reviewed the treatment of this disease condition.     We provided education, and discussed at great length the use of OrthoBiologics, specifically, Platelet Rich Plasma (PRP). We discussed the growing evidence for the efficacy of PRP injections with regard to the patient's specific findings, as well as the promotion of healing for muscle, tendon, and joint injuries.     We discussed the scientific rationale for this procedure which is that the plasma contains platelets which release growth factors that induce a healing response wherever they are applied. We also discussed the benefits, risks, and limitations. The patient understands that there is a slightly higher level of complexity to this procedure compared to other injections such as cortisone, or viscosupplementation.     We also discussed the benefits of PRP in comparison to surgery, specifically including, but not limited to: less invasive than an open surgical procedure for the same condition, possible shorter recovery time, significantly more cost effective.     The patient had opportunity to ask questions and all questions were answered appropriately.      Assessment & Plan  Left knee pain with possible meniscus root tear  Chronic left knee pain with MRI indicating posterior root medial meniscus disruption. Conservative management preferred due to chronicity and anatomical findings. Surgery not immediately indicated. Emphasized weight management for long-term success.  - Administer corticosteroid or PRP injection for symptomatic relief.  - Initiate physical therapy to improve knee function and reduce pain.  - Encourage weight management to support knee health.  - Order MRI of the right knee to assess for similar issues.    Early osteoarthritis changes in knees  Early osteoarthritis changes in both knees causing chronic pain. Conservative management preferred to manage symptoms and prevent progression. PRP injections considered beneficial but  not covered by insurance.  - Administer PRP injection for both knees to address wear and tear pain.  - Initiate physical therapy to improve joint function and reduce pain.  - Provide educational materials on osteoarthritis management and lifestyle modifications.      FOLLOW-UP:  Return for PRP      Anna Calles MD  Knee, Shoulder, & Elbow Surgery / Sports Medicine Specialist  Orthopaedic Surgery  [address] [place] [id number]   [email or web address]  [email or web address]  t: [phone]  o: [phone]  f: [phone]    This note was dictated using Dragon software.  While it was briefly proofread prior to completion, some grammatical, spelling, and word choice errors due to dictation may still occur.       [1]   Past Medical History:   Visual impairment    contacts/glasses   [2]   Past Surgical History:  Procedure Laterality Date    Ankle fracture surgery      Colonoscopy N/A 4/6/2022    Procedure: COLONOSCOPY;  Surgeon: Noe Joya DO;  Location:  ENDOSCOPY   [3] History reviewed. No pertinent family history.  [4]   No current outpatient medications on file.

## 2025-05-05 NOTE — PROGRESS NOTES
LOWER EXTREMITY EVALUATION:     Diagnosis:      Right hip pain (M25.551)   Referring Provider: Luis Montero  Date of Evaluation:    2025    Precautions:  None Next MD visit:   none scheduled  Date of Surgery: n/a     PATIENT SUMMARY   Sherman Foote is a 54 year old male who presents to therapy today with complaints of R lateral hip pain with standing, sitting and walking. He reports experiencing some numbness in quad. Patient reports he is also managing B knee pain. He is see Dr. Calles. Torn mensicus but not appropriate for repair per patient. Plan is for PRP injection in both knees that will be done by Dr. Calles this Friday.     Pt describes pain level current 2/10, at best 2/10, at worst 8/10.   Worse  sit to stand and walking, walking, getting up in morning  Better  movement after stationary    Job: sales - sitting, commuting, on field.     [facility] 1. There is a full thickness radial tear along the junction of the posterior horn and posterior root ligament of the medial meniscus.  Secondary mild peripheral extrusion of medial meniscal tissue with grade 1 chronic MCL sprain.   2. Diffuse mucinous degenerative changes of the ACL noted without evidence of a paco tear or disruption.   3. Mild chondromalacia of the patellofemoral and medial femoral tibial compartments without paco osteoarthritis.   4. Mild joint effusion.   5. Mild distal quadriceps tendinosis.     Current functional limitations include not doing 6-9 mile walk that helps with physical and emotional wellness. Limping with gait. 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    Sherman describes prior level of function 6-9 mile walk, lost significant weight. Pt goals include walk 6-9 miles for physical and emotional wellness including waiting loss. .    Past medical history was reviewed with Sherman. Significant findings include  has a past  medical history of Visual impairment.    He has no past medical history of Anesthesia complication, Difficult intubation, motion sickness, Malignant hyperthermia, PONV (postoperative nausea and vomiting), or Pseudocholinesterase deficiency.      ASSESSMENT  Sherman presents to physical therapy evaluation with primary c/o R hip pain and B knee pain. The results of the objective tests and measures show limited hip ROM, limited LE flexibility, overpronation of foot, limited trunk ROM, weakness in gluteals.  Functional deficits include but are not limited to Right hip pain (M25.551). Dr Calles recommended PT after injections. Patient would be appropriate to address both knees in concert with R hip pain in therapy. Patient demonstrates both hip and spine involvement. Improving hip mobility will help both knees and lumbar spine.  Signs and symptoms are consistent with diagnosis of Right hip pain (M25.551). Pt and PT discussed evaluation findings, pathology, POC and HEP.  Pt voiced understanding and performs HEP correctly without reported pain. Skilled Physical Therapy is medically necessary to address the above impairments and reach functional goals. Patient will benefit from therapy to receive manual therapy for joint and soft tissue manipulation; neuromuscular re-education for lumbopelvic hip coordiantion and therapeutic exercise for ROM, stretching, gluteal training..       OBJECTIVE:   Observation:  over pronation in R,  Palpation: High tenderness in lateral hip and GT bursa  Sensation: LT intact despite paraesthesia present    AROM: (* denotes performed with pain)  Hip Knee Foot/Ankle   Flexion: R 100 stiff end range; L 110  Extension: R 5; L 8  Abduction: R wnl; L wnl  ER: R 50%*; L 50%  IR: R 75%; L 75% To be tested    WNL     Accessory motion: To be tested     Flexibility:  Hip Flexor: R 5 , L 8  Hamstrings: R -20; L -20  Piriformis: R restricted; L restricted  Quads: R restricted; L restricted  Gastroc-soleus: R  wnl; L wnl    Strength/MMT: (* denotes performed with pain)  Hip Knee Foot/Ankle   Flexion: R 4/5; L 5/5  Extension: R 4/5; L 4/5  Abduction: R 4*/5; L 4/5  ER: R 4/5; L 5/5  IR: R 5/5; L 5/5 Flexion: R 4/5; L 5/5  Extension: R 5/5; L 5/5    DF: R 5/5; L 5/5  PF: R 5/5; L 5/5  INV: R 5/5; L 5/5  EV: R 5/5; L 5/5  Great toe ext: R 5/5; L 5/5     Special tests:   To be tested for knee    Gait: pt ambulates on level ground with antalgia and lateral shifting. Decrease hip extension with push off phase  Balance: SLS: R 5 sec, L 5 sec    Today’s Treatment and Response:   Pt education was provided on exam findings, treatment diagnosis, treatment plan, expectations, and prognosis. Pt was also provided recommendations for possible soreness after evaluation and shoe wear.  Patient was instructed in and issued a HEP for: Exercises  - Supine Lumbar Rotation Stretch  - 2 x daily - 7 x weekly - 1 sets - 4 reps - 30 hold  - Supine Hip External Rotation Stretch  - 2 x daily - 7 x weekly - 2 sets - 10 reps    Charges: PT Eval High Complexity, Ex      Total Timed Treatment: 50 min     Total Treatment Time: 50 min     Based on clinical rationale and outcome measures, this evaluation involved High Complexity decision making due to 3+ personal factors/comorbidities, 4+ body structures involved/activity limitations, and unstable symptoms including changing pain levels.  PLAN OF CARE:    Goals: (to be met in 14 visits)  Patient will demonstrate independence in performing home exercise program.  Patient will demonstrate increase hamstring flexibility measured at 80 degrees of hip flexion and -10 knee extension in order to decrease lumbar and hip strain with function  Patient will demonstrate increase hip ext 10 degrees B to assist with decrease hip and lumbar strain at push off phase of gait  Patient will demonstrate 75% trunk ROT to assist with transfers and bed mobility.   Patient will demonstrate 5/5 gluteal strength to improve assist  with stairs, gait and transfers.   Patient will report no limitation walking after sitting for 30 minutes.   Patient will return to walking 1 hour without pain limitation in knee and hip.   Patient will demonstrate increase hip ER 75% to reduce strain on knees and improve functional mobility.      Frequency / Duration: Patient will be seen for 2 x/week or a total of 14 visits over a 90 day period. Treatment will include: Manual Therapy, Neuromuscular Re-education, Therapeutic Exercise, and Home Exercise Program instruction    Education or treatment limitation: None  Rehab Potential:good    LEFS Score  LEFS Score: (Patient-Rptd) 48.75 % (5/3/2025  7:50 AM)      Patient/Family/Caregiver was advised of these findings, precautions, and treatment options and has agreed to actively participate in planning and for this course of care.    Thank you for your referral. Please co-sign or sign and return this letter via fax as soon as possible to [phone]. If you have any questions, please contact me at Dept: [phone]    Sincerely,  Electronically signed by therapist: Laura Johnston, PT  Physician's certification required: Yes  I certify the need for these services furnished under this plan of treatment and while under my care.    X___________________________________________________ Date____________________    Certification From: 5/6/2025  To:8/4/2025

## 2025-05-05 NOTE — TELEPHONE ENCOUNTER
Pt  appt for PRP injection in knees  Future Appointments   Date Time Provider Department Center   5/6/2025  6:00 AM  MR RM[facility].5T [facility]IDE[facility]   5/6/2025  8:45 AM Laura Johnston, PT PF [facility] [place]   5/7/2025  7:50 AM [email or web address]   5/13/2025  8:45 AM Laura Johnston, PT PF [facility] [place]   5/15/2025  8:45 AM Laura Johnston, PT PF [facility] [place]   5/19/2025  9:30 AM Sabrina Cohen, PTA PF [facility] [place]   5/22/2025  8:45 AM Laura Johnston, PT PF [facility] [place]

## 2025-05-05 NOTE — TELEPHONE ENCOUNTER
Pt appt for PRP injection   Future Appointments   Date Time Provider Department Center   5/6/2025  6:00 AM  MR RM[facility].5T [facility]IDE[facility]  [facility]   5/6/2025  8:45 AM Laura Johnston, PT PF PT [place]   5/9/2025  9:00 AM Anna Calles MD [id number]   5/13/2025  8:45 AM Laura Johnston, PT PF [facility] [place]   5/15/2025  8:45 AM Laura Johnston, PT PF [facility] [place]   5/19/2025  9:30 AM Sabrina Cohen, PTA PF [facility] [place]   5/22/2025  8:45 AM Laura Johnston, PT PF [facility] [place]

## 2025-05-06 ENCOUNTER — OFFICE VISIT (OUTPATIENT)
Dept: PHYSICAL THERAPY | Age: 55
End: 2025-05-06
Attending: STUDENT IN AN ORGANIZED HEALTH CARE EDUCATION/TRAINING PROGRAM
Payer: COMMERCIAL

## 2025-05-06 ENCOUNTER — TELEPHONE (OUTPATIENT)
Dept: PHYSICAL THERAPY | Facility: HOSPITAL | Age: 55
End: 2025-05-06

## 2025-05-06 ENCOUNTER — HOSPITAL ENCOUNTER (OUTPATIENT)
Dept: MRI IMAGING | Facility: HOSPITAL | Age: 55
Discharge: HOME | End: 2025-05-06
Attending: ORTHOPAEDIC SURGERY
Payer: COMMERCIAL

## 2025-05-06 DIAGNOSIS — M25.551 RIGHT HIP PAIN: Primary | ICD-10-CM

## 2025-05-06 DIAGNOSIS — M23.91 KNEE LOCKING, RIGHT: ICD-10-CM

## 2025-05-06 PROCEDURE — 73721 MRI JNT OF LWR EXTRE W/O DYE: CPT | Performed by: ORTHOPAEDIC SURGERY

## 2025-05-06 PROCEDURE — 97163 PT EVAL HIGH COMPLEX 45 MIN: CPT

## 2025-05-06 PROCEDURE — 97110 THERAPEUTIC EXERCISES: CPT

## 2025-05-09 ENCOUNTER — OFFICE VISIT (OUTPATIENT)
Dept: ORTHOPEDICS CLINIC | Facility: CLINIC | Age: 55
End: 2025-05-09
Payer: COMMERCIAL

## 2025-05-09 DIAGNOSIS — S83.242A OTHER TEAR OF MEDIAL MENISCUS OF LEFT KNEE AS CURRENT INJURY, INITIAL ENCOUNTER: Primary | ICD-10-CM

## 2025-05-09 DIAGNOSIS — M23.322 DERANGEMENT OF POSTERIOR HORN OF MEDIAL MENISCUS OF LEFT KNEE: ICD-10-CM

## 2025-05-09 DIAGNOSIS — M94.261 CHONDROMALACIA OF RIGHT KNEE: ICD-10-CM

## 2025-05-09 PROCEDURE — 0232T NJX PLATELET PLASMA: CPT | Performed by: ORTHOPAEDIC SURGERY

## 2025-05-09 NOTE — PROCEDURES
Bilateral Knee Intra-articular Injection    Name: Sherman Foote   MRN: [id number]  Date: 5/9/2025     Clinical Indications:   Persistent knee pain refractory to conservative measures.  Left knee medial meniscus root pathology    After informed consent, the injection site was marked, sterilized with topical chlorhexidine antiseptic, and locally anesthetized with skin refrigerant.    The patient was situation in a comfortable position. Using sterile technique: a prepared PRP syringe was injected utilizing anterolateral approach with a 22 gauge needle.  A band-aid was applied.  The patient tolerated the procedure well.        Anna Calles MD  Knee, Shoulder, & Elbow Surgery / Sports Medicine Specialist  Orthopaedic Surgery  [address] [place] [address].org  [email or web address]  t: [phone]  o: [phone]  f: [phone]

## 2025-05-13 ENCOUNTER — OFFICE VISIT (OUTPATIENT)
Dept: PHYSICAL THERAPY | Age: 55
End: 2025-05-13
Attending: STUDENT IN AN ORGANIZED HEALTH CARE EDUCATION/TRAINING PROGRAM
Payer: COMMERCIAL

## 2025-05-13 PROCEDURE — 97110 THERAPEUTIC EXERCISES: CPT

## 2025-05-13 NOTE — PROGRESS NOTES
Patient: Sherman Foote (54 year old, male) Referring Provider:  Insurance:   Diagnosis: Right hip pain (M25.551) [facility]   Date of Surgery: No data recorded Next MD visit:  N/A   Precautions:    No data recorded Referral Information:    Date of Evaluation: Req: 5, Auth: 5, Exp: 7/31/2025    No data recorded POC Auth Visits:          Today's Date   5/13/2025    Subjective  Patient report stretches helping. Want to discus ergonomics for work because notice different sitting positions might be more aggravating.       Pain: 5/10     Objective  Hamstring WNL. Ergonomic education with work, vehicle, home and bleachers              Assessment  Patient achieved 1 of 8 goals. Focus today on address flexibility for LE that will address both lumbar, hip, knee.    Goals (to be met in  )  (to be met in 14 visits)  Patient will demonstrate independence in performing home exercise program.  Patient will demonstrate increase hamstring flexibility measured at 80 degrees of hip flexion and -10 knee extension in order to decrease lumbar and hip strain with function. MET  Patient will demonstrate increase hip ext 10 degrees B to assist with decrease hip and lumbar strain at push off phase of gait  Patient will demonstrate 75% trunk ROT to assist with transfers and bed mobility.   Patient will demonstrate 5/5 gluteal strength to improve assist with stairs, gait and transfers.   Patient will report no limitation walking after sitting for 30 minutes.   Patient will return to walking 1 hour without pain limitation in knee and hip.   Patient will demonstrate increase hip ER 75% to reduce strain on knees and improve functional mobility.       Plan  Next session, continue with stretch, manual therapy for lumbar and lateral hip, initiate gluteal and multifidus exercises    Treatment Last 4 Visits  Treatment Day: 2       5/6/2025 5/13/2025   LE Treatment   Therapeutic Exercise  Nu Step WL 5 , 5 min  Hamstring stretch 30 sec x  4 on both sides  Hip Er manually Hold 30 sec x 3 R and x 4 on L  Piriformis stretch 30 secx 5 B without knee  Quad prone stretch 30 sec x 5 on both sides  Ergonomic education with seat position: hip higher than knee, modifications for different types of sitting   Therapeutic Exercise Minutes  45   Total Time Of Timed Procedures 0 45   Total Time Of Service-Based Procedures 0 0   Total Treatment Time 0 45   HEP  Exercises  - Supine Piriformis Stretch Pulling Heel to Hip  - 2 x daily - 7 x weekly - 2 sets - 10 reps  - Prone Quadriceps Stretch with Strap  - 2 x daily - 7 x weekly - 5 reps - 30 hold        HEP  Exercises  - Supine Piriformis Stretch Pulling Heel to Hip  - 2 x daily - 7 x weekly - 2 sets - 10 reps  - Prone Quadriceps Stretch with Strap  - 2 x daily - 7 x weekly - 5 reps - 30 hold    Charges     TE3     LOWER EXTREMITY EVALUATION:     Diagnosis:      Right hip pain (M25.551)   Referring Provider: Luis Montero  Date of Evaluation:    2025    Precautions:  None Next MD visit:   none scheduled  Date of Surgery: n/a     PATIENT SUMMARY   Sherman Foote is a 54 year old male who presents to therapy today with complaints of R lateral hip pain with standing, sitting and walking. He reports experiencing some numbness in quad. Patient reports he is also managing B knee pain. He is see Dr. Calles. Torn mensicus but not appropriate for repair per patient. Plan is for PRP injection in both knees that will be done by Dr. Calles this Friday.     Pt describes pain level current 2/10, at best 2/10, at worst 8/10.   Worse  sit to stand and walking, walking, getting up in morning  Better  movement after stationary    Job: sales - sitting, commuting, on field.     [id number] [facility] 1. There is a full thickness radial tear along the junction of the posterior horn and posterior root ligament of the medial meniscus.  Secondary mild peripheral extrusion of medial meniscal tissue with grade 1 chronic MCL sprain.    2. Diffuse mucinous degenerative changes of the ACL noted without evidence of a paco tear or disruption.   3. Mild chondromalacia of the patellofemoral and medial femoral tibial compartments without paco osteoarthritis.   4. Mild joint effusion.   5. Mild distal quadriceps tendinosis.     Current functional limitations include not doing 6-9 mile walk that helps with physical and emotional wellness. Limping with gait. 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    Sherman describes prior level of function 6-9 mile walk, lost significant weight. Pt goals include walk 6-9 miles for physical and emotional wellness including waiting loss. .    Past medical history was reviewed with Sherman. Significant findings include  has a past medical history of Visual impairment.    He has no past medical history of Anesthesia complication, Difficult intubation, motion sickness, Malignant hyperthermia, PONV (postoperative nausea and vomiting), or Pseudocholinesterase deficiency.      ASSESSMENT  Sherman presents to physical therapy evaluation with primary c/o R hip pain and B knee pain. The results of the objective tests and measures show limited hip ROM, limited LE flexibility, overpronation of foot, limited trunk ROM, weakness in gluteals.  Functional deficits include but are not limited to Right hip pain (M25.551). Dr Calles recommended PT after injections. Patient would be appropriate to address both knees in concert with R hip pain in therapy. Patient demonstrates both hip and spine involvement. Improving hip mobility will help both knees and lumbar spine.  Signs and symptoms are consistent with diagnosis of Right hip pain (M25.551). Pt and PT discussed evaluation findings, pathology, POC and HEP.  Pt voiced understanding and performs HEP correctly without reported pain. Skilled Physical Therapy is medically necessary to address the above  impairments and reach functional goals. Patient will benefit from therapy to receive manual therapy for joint and soft tissue manipulation; neuromuscular re-education for lumbopelvic hip coordiantion and therapeutic exercise for ROM, stretching, gluteal training..       OBJECTIVE:   Observation:  over pronation in R,  Palpation: High tenderness in lateral hip and GT bursa  Sensation: LT intact despite paraesthesia present    AROM: (* denotes performed with pain)  Hip Knee Foot/Ankle   Flexion: R 100 stiff end range; L 110  Extension: R 5; L 8  Abduction: R wnl; L wnl  ER: R 50%*; L 50%  IR: R 75%; L 75% To be tested    WNL     Accessory motion: To be tested     Flexibility:  Hip Flexor: R 5 , L 8  Hamstrings: R -20; L -20  Piriformis: R restricted; L restricted  Quads: R restricted; L restricted  Gastroc-soleus: R wnl; L wnl    Strength/MMT: (* denotes performed with pain)  Hip Knee Foot/Ankle   Flexion: R 4/5; L 5/5  Extension: R 4/5; L 4/5  Abduction: R 4*/5; L 4/5  ER: R 4/5; L 5/5  IR: R 5/5; L 5/5 Flexion: R 4/5; L 5/5  Extension: R 5/5; L 5/5    DF: R 5/5; L 5/5  PF: R 5/5; L 5/5  INV: R 5/5; L 5/5  EV: R 5/5; L 5/5  Great toe ext: R 5/5; L 5/5     Special tests:   To be tested for knee    Gait: pt ambulates on level ground with antalgia and lateral shifting. Decrease hip extension with push off phase  Balance: SLS: R 5 sec, L 5 sec    Today’s Treatment and Response:   Pt education was provided on exam findings, treatment diagnosis, treatment plan, expectations, and prognosis. Pt was also provided recommendations for possible soreness after evaluation and shoe wear.  Patient was instructed in and issued a HEP for: Exercises  - Supine Lumbar Rotation Stretch  - 2 x daily - 7 x weekly - 1 sets - 4 reps - 30 hold  - Supine Hip External Rotation Stretch  - 2 x daily - 7 x weekly - 2 sets - 10 reps    Charges: PT Eval High Complexity, Ex      Total Timed Treatment: 50 min     Total Treatment Time: 50 min     Based  on clinical rationale and outcome measures, this evaluation involved High Complexity decision making due to 3+ personal factors/comorbidities, 4+ body structures involved/activity limitations, and unstable symptoms including changing pain levels.  PLAN OF CARE:    Goals: (to be met in 14 visits)  Patient will demonstrate independence in performing home exercise program.  Patient will demonstrate increase hamstring flexibility measured at 80 degrees of hip flexion and -10 knee extension in order to decrease lumbar and hip strain with function - MET  Patient will demonstrate increase hip ext 10 degrees B to assist with decrease hip and lumbar strain at push off phase of gait  Patient will demonstrate 75% trunk ROT to assist with transfers and bed mobility.   Patient will demonstrate 5/5 gluteal strength to improve assist with stairs, gait and transfers.   Patient will report no limitation walking after sitting for 30 minutes.   Patient will return to walking 1 hour without pain limitation in knee and hip.   Patient will demonstrate increase hip ER 75% to reduce strain on knees and improve functional mobility.      Frequency / Duration: Patient will be seen for 2 x/week or a total of 14 visits over a 90 day period. Treatment will include: Manual Therapy, Neuromuscular Re-education, Therapeutic Exercise, and Home Exercise Program instruction    Education or treatment limitation: None  Rehab Potential:good    LEFS Score  LEFS Score: (Patient-Rptd) 48.75 % (5/3/2025  7:50 AM)      Patient/Family/Caregiver was advised of these findings, precautions, and treatment options and has agreed to actively participate in planning and for this course of care.    Thank you for your referral. Please co-sign or sign and return this letter via fax as soon as possible to [phone]. If you have any questions, please contact me at Dept: [phone]    Sincerely,  Electronically signed by therapist: Laura Johnston, PT  Physician's  certification required: Yes  I certify the need for these services furnished under this plan of treatment and while under my care.    X___________________________________________________ Date____________________    Certification From: 5/6/2025  To:8/4/2025

## 2025-05-15 ENCOUNTER — OFFICE VISIT (OUTPATIENT)
Dept: PHYSICAL THERAPY | Age: 55
End: 2025-05-15
Attending: STUDENT IN AN ORGANIZED HEALTH CARE EDUCATION/TRAINING PROGRAM
Payer: COMMERCIAL

## 2025-05-15 PROCEDURE — 97140 MANUAL THERAPY 1/> REGIONS: CPT

## 2025-05-15 PROCEDURE — 97110 THERAPEUTIC EXERCISES: CPT

## 2025-05-15 NOTE — PROGRESS NOTES
Patient: Sherman Foote (54 year old, male) Referring Provider:  Insurance:   Diagnosis: Right hip pain (M25.551) [facility]   Date of Surgery: No data recorded Next MD visit:  N/A   Precautions:    No data recorded Referral Information:    Date of Evaluation: Req: 5, Auth: 5, Exp: 7/31/2025    No data recorded POC Auth Visits:          Today's Date   5/15/2025    Subjective  Patient reports pain is same. Worse in morning. Feel stretches are helping get to spots that feel sore.       Pain: 5/10     Objective  Education on standing ergonomics and avoiding locking joints at sports events.  Increase hip flexor to 10 degres after stretching.              Assessment  Patient achieved 1 of 8 goals. Focus today on address flexibility for LE that will address both lumbar, hip, knee.    Goals (to be met in  )   (to be met in 14 visits)  Patient will demonstrate independence in performing home exercise program.  Patient will demonstrate increase hamstring flexibility measured at 80 degrees of hip flexion and -10 knee extension in order to decrease lumbar and hip strain with function. MET  Patient will demonstrate increase hip ext 10 degrees B to assist with decrease hip and lumbar strain at push off phase of gait  Patient will demonstrate 75% trunk ROT to assist with transfers and bed mobility.   Patient will demonstrate 5/5 gluteal strength to improve assist with stairs, gait and transfers.   Patient will report no limitation walking after sitting for 30 minutes.   Patient will return to walking 1 hour without pain limitation in knee and hip.   Patient will demonstrate increase hip ER 75% to reduce strain on knees and improve functional mobility.           Plan  Next session, continue with stretch, manual therapy for lumbar and lateral hip, initiate gluteal and multifidus exercises    Treatment Last 4 Visits  Treatment Day: 3       5/6/2025 5/13/2025 5/15/2025   LE Treatment   Therapeutic Exercise  Nu Step WL  5 , 5 min  Hamstring stretch 30 sec x 4 on both sides  Hip Er manually Hold 30 sec x 3 R and x 4 on L  Piriformis stretch 30 secx 5 B without knee  Quad prone stretch 30 sec x 5 on both sides  Ergonomic education with seat position: hip higher than knee, modifications for different types of sitting Nu Step WL 5, 5 min  Gastroc stretch 30 sec x 2  Hip flexor stretch manually romero test with SKC 30 sec x 5 on both sides alt  Review HEP     Manual Therapy   Tiger tail rolling quad and ITB  Education on use of tennis ball for lateral hip and lumbar p/s  SL STM lateral hip muscles and lumbar p.s   Therapeutic Exercise Minutes  45 25   Manual Therapy Minutes   25   Total Time Of Timed Procedures 0 45 50   Total Time Of Service-Based Procedures 0 0 0   Total Treatment Time 0 45 50   HEP  Exercises  - Supine Piriformis Stretch Pulling Heel to Hip  - 2 x daily - 7 x weekly - 2 sets - 10 reps  - Prone Quadriceps Stretch with Strap  - 2 x daily - 7 x weekly - 5 reps - 30 hold Add hip flexor stretch supine on bed with SKC        HEP  Add hip flexor stretch supine on bed with SKC    Charges     TE 2 MT2

## 2025-05-19 ENCOUNTER — TELEPHONE (OUTPATIENT)
Dept: PHYSICAL THERAPY | Facility: HOSPITAL | Age: 55
End: 2025-05-19

## 2025-05-22 ENCOUNTER — OFFICE VISIT (OUTPATIENT)
Dept: PHYSICAL THERAPY | Age: 55
End: 2025-05-22
Attending: STUDENT IN AN ORGANIZED HEALTH CARE EDUCATION/TRAINING PROGRAM
Payer: COMMERCIAL

## 2025-05-22 PROCEDURE — 97110 THERAPEUTIC EXERCISES: CPT

## 2025-05-22 NOTE — PROGRESS NOTES
Patient: Sherman Foote (54 year old, male) Referring Provider:  Insurance:   Diagnosis: Right hip pain (M25.551) [facility]   Date of Surgery: No data recorded Next MD visit:  N/A   Precautions:    No data recorded Referral Information:    Date of Evaluation: Req: 5, Auth: 5, Exp: 7/31/2025    No data recorded POC Auth Visits:          Today's Date   5/22/2025    Subjective  Patient report feel it with walking but less intense. Do not feel like limping. Got stretching strap. Doing stretches.       Pain: 4/10     Objective  Education on proper positioning for squat targeting gluteal              Assessment  Patient achieved 1 of 8 goals. Focus today on gluteal strengthening and neutral quad strengthening.    Goals (to be met in  )   (to be met in 14 visits)  Patient will demonstrate independence in performing home exercise program.  Patient will demonstrate increase hamstring flexibility measured at 80 degrees of hip flexion and -10 knee extension in order to decrease lumbar and hip strain with function. MET  Patient will demonstrate increase hip ext 10 degrees B to assist with decrease hip and lumbar strain at push off phase of gait  Patient will demonstrate 75% trunk ROT to assist with transfers and bed mobility.   Patient will demonstrate 5/5 gluteal strength to improve assist with stairs, gait and transfers.   Patient will report no limitation walking after sitting for 30 minutes.   Patient will return to walking 1 hour without pain limitation in knee and hip.   Patient will demonstrate increase hip ER 75% to reduce strain on knees and improve functional mobility.               Plan  Next session, continue with stretch, manual therapy for lumbar and lateral hip, initiate gluteal and multifidus exercises    Treatment Last 4 Visits  Treatment Day: 4       5/6/2025 5/13/2025 5/15/2025 5/22/2025   LE Treatment   Therapeutic Exercise  Nu Step WL 5 , 5 min  Hamstring stretch 30 sec x 4 on both  sides  Hip Er manually Hold 30 sec x 3 R and x 4 on L  Piriformis stretch 30 secx 5 B without knee  Quad prone stretch 30 sec x 5 on both sides  Ergonomic education with seat position: hip higher than knee, modifications for different types of sitting Nu Step WL 5, 5 min  Gastroc stretch 30 sec x 2  Hip flexor stretch manually romero test with SKC 30 sec x 5 on both sides alt  Review HEP   Nu step WL 5, 5 min  Shuttle DLP  BBBB, 4 x 20 BTB  Standing GTB:  Hip abd x 2 sets of 10  Hip ext x 2 sets of 10  Side stepping 2 laps  Squats with weight in center to cue gluts  Review stretches      Manual Therapy   Tiger tail rolling quad and ITB  Education on use of tennis ball for lateral hip and lumbar p/s  SL STM lateral hip muscles and lumbar p.s    Therapeutic Exercise Minutes  45 25 45   Manual Therapy Minutes   25    Total Time Of Timed Procedures 0 45 50 45   Total Time Of Service-Based Procedures 0 0 0 0   Total Treatment Time 0 45 50 45   HEP  Exercises  - Supine Piriformis Stretch Pulling Heel to Hip  - 2 x daily - 7 x weekly - 2 sets - 10 reps  - Prone Quadriceps Stretch with Strap  - 2 x daily - 7 x weekly - 5 reps - 30 hold Add hip flexor stretch supine on bed with SKC - Hip Abduction with Resistance Loop  - 2 x daily - 7 x weekly - 2 sets - 10 reps  - Hip Extension with Resistance Loop  - 2 x daily - 7 x weekly - 2 sets - 10 reps  - Side Stepping with Resistance at Feet  - 2 x daily - 7 x weekly - 2 sets - 10 reps  - Squat  - 2 x daily - 7 x weekly - 2 sets - 10 reps        HEP  - Hip Abduction with Resistance Loop  - 2 x daily - 7 x weekly - 2 sets - 10 reps  - Hip Extension with Resistance Loop  - 2 x daily - 7 x weekly - 2 sets - 10 reps  - Side Stepping with Resistance at Feet  - 2 x daily - 7 x weekly - 2 sets - 10 reps  - Squat  - 2 x daily - 7 x weekly - 2 sets - 10 reps    Charges     3

## 2025-05-27 ENCOUNTER — OFFICE VISIT (OUTPATIENT)
Dept: PHYSICAL THERAPY | Age: 55
End: 2025-05-27
Attending: STUDENT IN AN ORGANIZED HEALTH CARE EDUCATION/TRAINING PROGRAM
Payer: COMMERCIAL

## 2025-05-27 PROCEDURE — 97110 THERAPEUTIC EXERCISES: CPT

## 2025-05-27 PROCEDURE — 97140 MANUAL THERAPY 1/> REGIONS: CPT

## 2025-05-27 NOTE — PROGRESS NOTES
Patient: Sherman Foote (54 year old, male) Referring Provider:  Insurance:   Diagnosis: Right hip pain (M25.551) [facility]   Date of Surgery: No data recorded Next MD visit:  N/A   Precautions:    No data recorded Referral Information:    Date of Evaluation: Req: 5, Auth: 5, Exp: 7/31/2025    No data recorded POC Auth Visits:          Today's Date   5/27/2025    Subjective  Patient feels tight when wakes up.  Walked 3 miles today and pain and tightness in knees and back. Feels same but stretches help.       Pain: 5/10     Objective  Education on gait pattern to avoid lateral shifting with gait.              Assessment  Focus on address reducing lateral shift to reduce strain at hip and spine with gait. Pt able to self correct.  Patient met 1 of 8 goals.    Goals (to be met in  )   (to be met in 14 visits)  Patient will demonstrate independence in performing home exercise program.  Patient will demonstrate increase hamstring flexibility measured at 80 degrees of hip flexion and -10 knee extension in order to decrease lumbar and hip strain with function. MET  Patient will demonstrate increase hip ext 10 degrees B to assist with decrease hip and lumbar strain at push off phase of gait  Patient will demonstrate 75% trunk ROT to assist with transfers and bed mobility.   Patient will demonstrate 5/5 gluteal strength to improve assist with stairs, gait and transfers.   Patient will report no limitation walking after sitting for 30 minutes.   Patient will return to walking 1 hour without pain limitation in knee and hip.   Patient will demonstrate increase hip ER 75% to reduce strain on knees and improve functional mobility.                   Plan  Continue PT and add multifidus and open book for home. Focus on gait: TM and side stepping through core.    Treatment Last 4 Visits  Treatment Day: 5       5/13/2025 5/15/2025 5/22/2025 5/27/2025   LE Treatment   Therapeutic Exercise Nu Step WL 5 , 5  min  Hamstring stretch 30 sec x 4 on both sides  Hip Er manually Hold 30 sec x 3 R and x 4 on L  Piriformis stretch 30 secx 5 B without knee  Quad prone stretch 30 sec x 5 on both sides  Ergonomic education with seat position: hip higher than knee, modifications for different types of sitting Nu Step WL 5, 5 min  Gastroc stretch 30 sec x 2  Hip flexor stretch manually romero test with SKC 30 sec x 5 on both sides alt  Review HEP   Nu step WL 5, 5 min  Shuttle DLP  BBBB, 4 x 20 BTB  Standing GTB:  Hip abd x 2 sets of 10  Hip ext x 2 sets of 10  Side stepping 2 laps  Squats with weight in center to cue gluts  Review stretches    Nu Step WL 5 , 5 min  Gastroc stretch 30 sec x 4  Shuttle DLP BTB BBBB 4 x 20  SL Clams with TrA 2 x 10 B  Open close book with  2  x 10 B       Manual Therapy  Tiger tail rolling quad and ITB  Education on use of tennis ball for lateral hip and lumbar p/s  SL STM lateral hip muscles and lumbar p.s  Tiger tail ITB, hip ER  Strumming hip ER prone   Education on use of Tennis ball at home  PA L5/S1 TP GIV   Therapeutic Exercise Minutes 45 25 45 35   Manual Therapy Minutes  25  20   Total Time Of Timed Procedures 45 50 45 55   Total Time Of Service-Based Procedures 0 0 0 0   Total Treatment Time 45 50 45 55   HEP Exercises  - Supine Piriformis Stretch Pulling Heel to Hip  - 2 x daily - 7 x weekly - 2 sets - 10 reps  - Prone Quadriceps Stretch with Strap  - 2 x daily - 7 x weekly - 5 reps - 30 hold Add hip flexor stretch supine on bed with SKC - Hip Abduction with Resistance Loop  - 2 x daily - 7 x weekly - 2 sets - 10 reps  - Hip Extension with Resistance Loop  - 2 x daily - 7 x weekly - 2 sets - 10 reps  - Side Stepping with Resistance at Feet  - 2 x daily - 7 x weekly - 2 sets - 10 reps  - Squat  - 2 x daily - 7 x weekly - 2 sets - 10 reps Tennis ball to hip ER against wall.         HEP  Tennis ball to hip ER against wall.     Charges     TE2

## 2025-05-29 ENCOUNTER — APPOINTMENT (OUTPATIENT)
Dept: PHYSICAL THERAPY | Age: 55
End: 2025-05-29
Attending: STUDENT IN AN ORGANIZED HEALTH CARE EDUCATION/TRAINING PROGRAM
Payer: COMMERCIAL

## 2025-06-03 ENCOUNTER — OFFICE VISIT (OUTPATIENT)
Dept: PHYSICAL THERAPY | Age: 55
End: 2025-06-03
Attending: STUDENT IN AN ORGANIZED HEALTH CARE EDUCATION/TRAINING PROGRAM
Payer: COMMERCIAL

## 2025-06-03 PROCEDURE — 97110 THERAPEUTIC EXERCISES: CPT

## 2025-06-03 NOTE — PROGRESS NOTES
Patient: Sherman Foote (54 year old, male) Referring Provider:  Insurance:   Diagnosis: Right hip pain (M25.551) [facility]  [facility]   Date of Surgery: No data recorded Next MD visit:  N/A   Precautions:    No data recorded Referral Information:    Date of Evaluation: Req: 10, Auth: 10, Exp: 7/31/2025    No data recorded POC Auth Visits:          Today's Date   6/3/2025    Subjective  Patient 15% better. Working on walking straight. Walking 3 mile.       Pain: 4/10     Objective  Improve awareness with gait with less side to side.              Assessment  Progressing with decrease joint irritation with 3 mile walk.Focus treatment on increase hip stability with gait    Goals (to be met in  )   (to be met in 14 visits)  Patient will demonstrate independence in performing home exercise program.  Patient will demonstrate increase hamstring flexibility measured at 80 degrees of hip flexion and -10 knee extension in order to decrease lumbar and hip strain with function. MET  Patient will demonstrate increase hip ext 10 degrees B to assist with decrease hip and lumbar strain at push off phase of gait  Patient will demonstrate 75% trunk ROT to assist with transfers and bed mobility.   Patient will demonstrate 5/5 gluteal strength to improve assist with stairs, gait and transfers.   Patient will report no limitation walking after sitting for 30 minutes.   Patient will return to walking 1 hour without pain limitation in knee and hip.   Patient will demonstrate increase hip ER 75% to reduce strain on knees and improve functional mobility.                       Plan  Continue PT focused on hip ext and hip lateral stability with gait    Treatment Last 4 Visits  Treatment Day: 6       5/15/2025 5/22/2025 5/27/2025 6/3/2025   LE Treatment   Therapeutic Exercise Nu Step WL 5, 5 min  Gastroc stretch 30 sec x 2  Hip flexor stretch manually romero test with SKC 30 sec x 5 on both sides alt  Review HEP   Nu step WL 5, 5  min  Shuttle DLP  BBBB, 4 x 20 BTB  Standing GTB:  Hip abd x 2 sets of 10  Hip ext x 2 sets of 10  Side stepping 2 laps  Squats with weight in center to cue gluts  Review stretches    Nu Step WL 5 , 5 min  Gastroc stretch 30 sec x 4  Shuttle DLP BTB BBBB 4 x 20  SL Clams with TrA 2 x 10 B  Open close book with  2  x 10 B     Nu Step WL 6, 5 min  Quad stretch prone 30 sec x 4  Shuttle DLP BBBB BTB 3 x 20  Shuttle SLP BBB 3 x 15  TM backward walking 1.5 2 min  Side stepping 1.0 mph 1 min B  Inverted BOSU 1 min  BOSU side lunge 2 x 10  Title board AP SL and 4 corner tap x 10         Manual Therapy Tiger tail rolling quad and ITB  Education on use of tennis ball for lateral hip and lumbar p/s  SL STM lateral hip muscles and lumbar p.s  Tiger tail ITB, hip ER  Strumming hip ER prone   Education on use of Tennis ball at home  PA L5/S1 TP GIV    Therapeutic Exercise Minutes 25 45 35 43   Manual Therapy Minutes 25  20    Total Time Of Timed Procedures 50 45 55 43   Total Time Of Service-Based Procedures 0 0 0 0   Total Treatment Time 50 45 55 43   HEP Add hip flexor stretch supine on bed with SKC - Hip Abduction with Resistance Loop  - 2 x daily - 7 x weekly - 2 sets - 10 reps  - Hip Extension with Resistance Loop  - 2 x daily - 7 x weekly - 2 sets - 10 reps  - Side Stepping with Resistance at Feet  - 2 x daily - 7 x weekly - 2 sets - 10 reps  - Squat  - 2 x daily - 7 x weekly - 2 sets - 10 reps Tennis ball to hip ER against wall.          HEP  Tennis ball to hip ER against wall.     Charges     TE 3

## 2025-06-17 ENCOUNTER — APPOINTMENT (OUTPATIENT)
Dept: PHYSICAL THERAPY | Age: 55
End: 2025-06-17
Attending: STUDENT IN AN ORGANIZED HEALTH CARE EDUCATION/TRAINING PROGRAM
Payer: COMMERCIAL

## 2025-06-19 ENCOUNTER — APPOINTMENT (OUTPATIENT)
Dept: PHYSICAL THERAPY | Age: 55
End: 2025-06-19
Attending: STUDENT IN AN ORGANIZED HEALTH CARE EDUCATION/TRAINING PROGRAM
Payer: COMMERCIAL

## 2025-06-20 ENCOUNTER — APPOINTMENT (OUTPATIENT)
Dept: PHYSICAL THERAPY | Age: 55
End: 2025-06-20
Attending: STUDENT IN AN ORGANIZED HEALTH CARE EDUCATION/TRAINING PROGRAM
Payer: COMMERCIAL

## 2025-06-23 ENCOUNTER — OFFICE VISIT (OUTPATIENT)
Dept: PHYSICAL THERAPY | Age: 55
End: 2025-06-23
Attending: STUDENT IN AN ORGANIZED HEALTH CARE EDUCATION/TRAINING PROGRAM
Payer: COMMERCIAL

## 2025-06-23 PROCEDURE — 97110 THERAPEUTIC EXERCISES: CPT

## 2025-06-23 NOTE — PROGRESS NOTES
Patient: Sherman Foote (54 year old, male) Referring Provider:  Insurance:   Diagnosis: Right hip pain (M25.551) [facility]   Date of Surgery: No data recorded Next MD visit:  N/A   Precautions:    No data recorded Referral Information:    Date of Evaluation: Req: 10, Auth: 10, Exp: 7/31/2025    No data recorded POC Auth Visits:          Today's Date   6/23/2025    Subjective  \" I con to have pain 5/10 on the side o my right hip and prolonged seated and standing activites \".       Pain: 5/10     Objective                 Assessment  Incorporated light resistance clam shell and added step up / lateral step up on 6 inch step and initiated side stepping / monster walk with red TB to further challange functional glut stability in order to faciliate and promote symmetrical gait pattern.Patient was able to complete all given activites without report of increase in current hip pain level . Attempted to incorporated hip abduction ex in side lying however patient reported significant increase in pain level .    Goals (to be met in  )   (to be met in 14 visits)  Patient will demonstrate independence in performing home exercise program.  Patient will demonstrate increase hamstring flexibility measured at 80 degrees of hip flexion and -10 knee extension in order to decrease lumbar and hip strain with function. MET  Patient will demonstrate increase hip ext 10 degrees B to assist with decrease hip and lumbar strain at push off phase of gait  Patient will demonstrate 75% trunk ROT to assist with transfers and bed mobility.   Patient will demonstrate 5/5 gluteal strength to improve assist with stairs, gait and transfers.   Patient will report no limitation walking after sitting for 30 minutes.   Patient will return to walking 1 hour without pain limitation in knee and hip.   Patient will demonstrate increase hip ER 75% to reduce strain on knees and improve functional mobility.        Plan  Continue PT as per  current POC.    Treatment Last 4 Visits  Treatment Day: 7       5/22/2025 5/27/2025 6/3/2025 6/23/2025   LE Treatment   Therapeutic Exercise Nu step WL 5, 5 min  Shuttle DLP  BBBB, 4 x 20 BTB  Standing GTB:  Hip abd x 2 sets of 10  Hip ext x 2 sets of 10  Side stepping 2 laps  Squats with weight in center to cue gluts  Review stretches    Nu Step WL 5 , 5 min  Gastroc stretch 30 sec x 4  Shuttle DLP BTB BBBB 4 x 20  SL Clams with TrA 2 x 10 B  Open close book with  2  x 10 B     Nu Step WL 6, 5 min  Quad stretch prone 30 sec x 4  Shuttle DLP BBBB BTB 3 x 20  Shuttle SLP BBB 3 x 15  TM backward walking 1.5 2 min  Side stepping 1.0 mph 1 min B  Inverted BOSU 1 min  BOSU side lunge 2 x 10  Title board AP SL and 4 corner tap x 10       NU step x 6 min , level 5  Side lying :  FR over R lateral hip and ITB x 5 min   Supine :  Modified Fernandez stretch ( hip flexor and quad ) with the strap 10 x 10 sec hold   R piriformis stretch ( ER/IR ) 10 x 10 sec hold   Adductors activation with yellow ball 2 x 10  Abductors activation with fitness ring 2 x 10   Side lying :  R calm shell with 2.5 lbs 2 x 10  L hip abd ( stopped due to increase in hip pain )  Standing :  Lumbar extension at mat table 2 x 10   Step up / lateral step up on 6 inch step 2 x 10 ea   Side stepping with red TB 40 ft x 2   Monster walk with red TB 40 ft x 2    Manual Therapy  Dodge tail ITB, hip ER  Strumming hip ER prone   Education on use of Tennis ball at home  PA L5/S1 TP GIV     Therapeutic Exercise Minutes 45 35 43 45   Manual Therapy Minutes  20     Total Time Of Timed Procedures 45 55 43 45   Total Time Of Service-Based Procedures 0 0 0 0   Total Treatment Time 45 55 43 45   HEP - Hip Abduction with Resistance Loop  - 2 x daily - 7 x weekly - 2 sets - 10 reps  - Hip Extension with Resistance Loop  - 2 x daily - 7 x weekly - 2 sets - 10 reps  - Side Stepping with Resistance at Feet  - 2 x daily - 7 x weekly - 2 sets - 10 reps  - Squat  - 2 x daily - 7 x  weekly - 2 sets - 10 reps Tennis ball to hip ER against wall.           HEP  Tennis ball to hip ER against wall.     Charges     there - ex x 3

## 2025-06-25 ENCOUNTER — OFFICE VISIT (OUTPATIENT)
Dept: PHYSICAL THERAPY | Age: 55
End: 2025-06-25
Attending: STUDENT IN AN ORGANIZED HEALTH CARE EDUCATION/TRAINING PROGRAM
Payer: COMMERCIAL

## 2025-06-25 PROCEDURE — 97110 THERAPEUTIC EXERCISES: CPT

## 2025-06-25 NOTE — PROGRESS NOTES
Patient: Sherman Foote (54 year old, male) Referring Provider:  Insurance:   Diagnosis: Right hip pain (M25.551) [facility]   Date of Surgery: No data recorded Next MD visit:  N/A   Precautions:    No data recorded Referral Information:    Date of Evaluation: Req: 10, Auth: 10, Exp: 7/31/2025    No data recorded POC Auth Visits:          Today's Date   6/25/2025    Subjective  \" I still continue to have pain on the side of my right hip when I walk prolonged distances and during transition from seated to standing position . Current hip pain 5/10 \".       Pain: 5/10     Objective                 Assessment  Initiated step up / lateral step up and lunges on to BOSU to progress glut and quad functional strength for improved gait mechanics and to increase ease with stairs negotiation .    Goals (to be met in  )   (to be met in 14 visits)  Patient will demonstrate independence in performing home exercise program.  Patient will demonstrate increase hamstring flexibility measured at 80 degrees of hip flexion and -10 knee extension in order to decrease lumbar and hip strain with function. MET  Patient will demonstrate increase hip ext 10 degrees B to assist with decrease hip and lumbar strain at push off phase of gait  Patient will demonstrate 75% trunk ROT to assist with transfers and bed mobility.   Patient will demonstrate 5/5 gluteal strength to improve assist with stairs, gait and transfers.   Patient will report no limitation walking after sitting for 30 minutes.   Patient will return to walking 1 hour without pain limitation in knee and hip.   Patient will demonstrate increase hip ER 75% to reduce strain on knees .                                                                                                                                                Plan  Continue PT as per current POC.    Treatment Last 4 Visits  Treatment Day: 8       5/27/2025 6/3/2025 6/23/2025 6/25/2025   LE Treatment    Therapeutic Exercise Nu Step WL 5 , 5 min  Gastroc stretch 30 sec x 4  Shuttle DLP BTB BBBB 4 x 20  SL Clams with TrA 2 x 10 B  Open close book with  2  x 10 B     Nu Step WL 6, 5 min  Quad stretch prone 30 sec x 4  Shuttle DLP BBBB BTB 3 x 20  Shuttle SLP BBB 3 x 15  TM backward walking 1.5 2 min  Side stepping 1.0 mph 1 min B  Inverted BOSU 1 min  BOSU side lunge 2 x 10  Title board AP SL and 4 corner tap x 10       NU step x 6 min , level 5  Side lying :  FR over R lateral hip and ITB x 5 min   Supine :  Modified Fernandez stretch ( hip flexor and quad ) with the strap 10 x 10 sec hold   R piriformis stretch ( ER/IR ) 10 x 10 sec hold   Adductors activation with yellow ball 2 x 10  Abductors activation with fitness ring 2 x 10   Side lying :  R calm shell with 2.5 lbs 2 x 10  L hip abd ( stopped due to increase in hip pain )  Standing :  Lumbar extension at mat table 2 x 10   Step up / lateral step up on 6 inch step 2 x 10 ea   Side stepping with red TB 40 ft x 2   Monster walk with red TB 40 ft x 2  Nu STEP X 6 min , level 5  Side lying :  STM / FR to R lateral hip and along ITB   Supine :  R piriformis stretch ( ER / IR ) 10 x 10 sec hold   TrA activation with :  Adductors activation 2 x 15   Abductors activation 2 x 15   Bridging 2 x 15   Standing :  B gastroc stretch on slant board 5 x 20 sec hold   B heel raises x 20  B toes raises x 20  BOSU :  Alt step ups 2 x 10   Lateral step ups 2 x 10   Alt lunges 2 x 10   Lateral lunges 2 x 10   Walking lunges with trunk rotation 40 ft x 2   Side stepping with blue TB 40 ft x 2    Manual Therapy Frederick tail ITB, hip ER  Strumming hip ER prone   Education on use of Tennis ball at home  PA L5/S1 TP GIV      Therapeutic Exercise Minutes 35 43 45 45   Manual Therapy Minutes 20      Total Time Of Timed Procedures 55 43 45 45   Total Time Of Service-Based Procedures 0 0 0 0   Total Treatment Time 55 43 45 45   HEP Tennis ball to hip ER against wall.            HEP  Tennis  ball to hip ER against wall.     Charges     there - ex x 3

## 2025-07-10 ENCOUNTER — APPOINTMENT (OUTPATIENT)
Dept: PHYSICAL THERAPY | Age: 55
End: 2025-07-10
Attending: STUDENT IN AN ORGANIZED HEALTH CARE EDUCATION/TRAINING PROGRAM
Payer: COMMERCIAL

## 2025-07-15 ENCOUNTER — OFFICE VISIT (OUTPATIENT)
Dept: PHYSICAL THERAPY | Age: 55
End: 2025-07-15
Attending: STUDENT IN AN ORGANIZED HEALTH CARE EDUCATION/TRAINING PROGRAM
Payer: COMMERCIAL

## 2025-07-15 PROCEDURE — 97110 THERAPEUTIC EXERCISES: CPT

## 2025-07-15 PROCEDURE — 97140 MANUAL THERAPY 1/> REGIONS: CPT

## 2025-07-15 NOTE — PROGRESS NOTES
Patient: Sherman Foote (54 year old, male) Referring Provider:  Insurance:   Diagnosis: Right hip pain (M25.551) [facility]  [facility]   Date of Surgery: No data recorded Next MD visit:  N/A   Precautions:    No data recorded Referral Information:    Date of Evaluation: Req: 10, Auth: 10, Exp: 7/31/2025    No data recorded POC Auth Visits:          Today's Date - Discharge   7/15/2025    Subjective  Return to walking for exercise for 3 miles one time and 2 miles another time during the day. Less limping.  Pain is present but more tolerable.  Knee has been bothering.       Pain: 2/10 (2-3)     Objective  Trunk ROM full. Hip ROM full. Hamstring 90 hip flexion. Hip ext increase <10 degrees. Tightness lateral to medial PF glide. Pain at end range flexion of knee. Significant restriction in MF at quad and ITB. improve with manual therapy and stretching today. Patient had less pain wtih squat, walking and sit to stand after manual therpay       Post LEFS Score  No data recorded  -48.75 % improvement        Assessment  Patient met 100% of goals and returne to walking. Recommend if R hip and leg pain increases to consult MD also about spine.  Recommend patient consult ortho if L knee continues to be limiting in function after stretching. Current exercise program targets lumbopelvic hip stretching and strengthening with knee joint protection .    Goals (to be met in  )   (to be met in 14 visits)  Patient will demonstrate independence in performing home exercise program.  Patient will demonstrate increase hamstring flexibility measured at 80 degrees of hip flexion and -10 knee extension in order to decrease lumbar and hip strain with function. MET  Patient will demonstrate increase hip ext 10 degrees B to assist with decrease hip and lumbar strain at push off phase of gait  Patient will demonstrate 75% trunk ROT to assist with transfers and bed mobility.   Patient will demonstrate 5/5 gluteal strength to improve assist  with stairs, gait and transfers.   Patient will report no limitation walking after sitting for 30 minutes.   Patient will return to walking 1 hour without pain limitation in knee and hip.   Patient will demonstrate increase hip ER 75% to reduce strain on knees and improve functional mobility.                           Plan  Discharge to continue with HEP.    Treatment Last 4 Visits  Treatment Day: 9       6/3/2025 6/23/2025 6/25/2025 7/15/2025   LE Treatment   Therapeutic Exercise Nu Step WL 6, 5 min  Quad stretch prone 30 sec x 4  Shuttle DLP BBBB BTB 3 x 20  Shuttle SLP BBB 3 x 15  TM backward walking 1.5 2 min  Side stepping 1.0 mph 1 min B  Inverted BOSU 1 min  BOSU side lunge 2 x 10  Title board AP SL and 4 corner tap x 10       NU step x 6 min , level 5  Side lying :  FR over R lateral hip and ITB x 5 min   Supine :  Modified Fernandez stretch ( hip flexor and quad ) with the strap 10 x 10 sec hold   R piriformis stretch ( ER/IR ) 10 x 10 sec hold   Adductors activation with yellow ball 2 x 10  Abductors activation with fitness ring 2 x 10   Side lying :  R calm shell with 2.5 lbs 2 x 10  L hip abd ( stopped due to increase in hip pain )  Standing :  Lumbar extension at mat table 2 x 10   Step up / lateral step up on 6 inch step 2 x 10 ea   Side stepping with red TB 40 ft x 2   Monster walk with red TB 40 ft x 2  Nu STEP X 6 min , level 5  Side lying :  STM / FR to R lateral hip and along ITB   Supine :  R piriformis stretch ( ER / IR ) 10 x 10 sec hold   TrA activation with :  Adductors activation 2 x 15   Abductors activation 2 x 15   Bridging 2 x 15   Standing :  B gastroc stretch on slant board 5 x 20 sec hold   B heel raises x 20  B toes raises x 20  BOSU :  Alt step ups 2 x 10   Lateral step ups 2 x 10   Alt lunges 2 x 10   Lateral lunges 2 x 10   Walking lunges with trunk rotation 40 ft x 2   Side stepping with blue TB 40 ft x 2  Re-evaluation  Education on squat with knee joint protection. Patient able to  perform without pain.   Review stretching and strengthening HEP  Prone quad stretch with strap 30 sec x 4     Manual Therapy    STM L quad and ITB MFR   PF GIV lateral to medial joint mobilization   Therapeutic Exercise Minutes 43 45 45 25   Manual Therapy Minutes    10   Total Time Of Timed Procedures 43 45 45 35   Total Time Of Service-Based Procedures 0 0 0 0   Total Treatment Time 43 45 45 35   HEP    Added  Prone quad stretch   Squat (backward seated squat        HEP  Added  Prone quad stretch   Squat (backward seated squat    Charges     TE2 MT

## (undated) DEVICE — ENDOSCOPY PACK - LOWER: Brand: MEDLINE INDUSTRIES, INC.

## (undated) DEVICE — FILTERLINE NASAL ADULT O2/CO2

## (undated) DEVICE — 1200CC GUARDIAN II: Brand: GUARDIAN

## (undated) DEVICE — MEDI-VAC NON-CONDUCTIVE SUCTION TUBING: Brand: CARDINAL HEALTH

## (undated) DEVICE — MEDI-VAC SUCTION HANDLE REGULAR CAPACITY: Brand: CARDINAL HEALTH

## (undated) DEVICE — Device: Brand: DEFENDO AIR/WATER/SUCTION AND BIOPSY VALVE

## (undated) NOTE — LETTER
Patient Name: Dawna Mendenhall  YOB: 1970          MRN number:  [id number]  Date:  3/25/2019  Referring Physician:  Niko Mosqueda    Discharge Summary  Initial Functional Outcome Score 64/100  Final Functional Outcome Score 95/100  Number of Assessment: Vic Millard has completed 8 visits of PT. Remonia Bodo improved from 64/100 to 95/100 at [place]. All goals met below. Vic Millard returned to workout regiment at [employer] at 60% normal weight this weekend without issue.  Discussed increasing weight sl Electronically signed by therapist: Jamison Beckwith PT

## (undated) NOTE — LETTER
Patient Name: Elly Goss  YOB: 1970          MRN number:  [id number]  Date:  3/25/2019  Referring Physician:  Marshall Waller    Discharge Summary  Initial Functional Outcome Score 64/100  Final Functional Outcome Score 95/100  Number of

## (undated) NOTE — LETTER
03/11/19        Roseann Sandoval  [facility]      Dear Ann Juarez,    [facility] records indicate that you have outstanding lab work and or testing that was ordered for you and has not yet been completed:  Orders Placed This Encounter